# Patient Record
Sex: FEMALE | Race: WHITE | NOT HISPANIC OR LATINO | Employment: FULL TIME | URBAN - METROPOLITAN AREA
[De-identification: names, ages, dates, MRNs, and addresses within clinical notes are randomized per-mention and may not be internally consistent; named-entity substitution may affect disease eponyms.]

---

## 2023-09-08 LAB
ANION GAP IN SER/PLAS: 16 MMOL/L (ref 10–20)
CALCIDIOL (25 OH VITAMIN D3) (NG/ML) IN SER/PLAS: 34 NG/ML
CALCIUM (MG/DL) IN SER/PLAS: 9.6 MG/DL (ref 8.6–10.6)
CARBON DIOXIDE, TOTAL (MMOL/L) IN SER/PLAS: 25 MMOL/L (ref 21–32)
CHLORIDE (MMOL/L) IN SER/PLAS: 105 MMOL/L (ref 98–107)
CREATININE (MG/DL) IN SER/PLAS: 0.69 MG/DL (ref 0.5–1.05)
GFR FEMALE: >90 ML/MIN/1.73M2
GLUCOSE (MG/DL) IN SER/PLAS: 84 MG/DL (ref 74–99)
IGA (MG/DL) IN SER/PLAS: 55 MG/DL (ref 70–400)
IGG (MG/DL) IN SER/PLAS: 1510 MG/DL (ref 700–1600)
IGM (MG/DL) IN SER/PLAS: 102 MG/DL (ref 40–230)
MAGNESIUM (MG/DL) IN SER/PLAS: 2.14 MG/DL (ref 1.6–2.4)
POTASSIUM (MMOL/L) IN SER/PLAS: 4.3 MMOL/L (ref 3.5–5.3)
SODIUM (MMOL/L) IN SER/PLAS: 142 MMOL/L (ref 136–145)
UREA NITROGEN (MG/DL) IN SER/PLAS: 11 MG/DL (ref 6–23)

## 2023-09-12 LAB — VITAMIN C: 117 UMOL/L (ref 23–114)

## 2023-09-14 PROBLEM — F32.A DEPRESSION: Status: ACTIVE | Noted: 2023-09-14

## 2023-09-14 PROBLEM — E06.3 HASHIMOTO'S DISEASE: Status: ACTIVE | Noted: 2023-09-14

## 2023-09-14 PROBLEM — I73.00 RAYNAUD'S DISEASE WITHOUT GANGRENE: Status: ACTIVE | Noted: 2023-09-14

## 2023-09-14 PROBLEM — D69.6 THROMBOCYTOPENIA (CMS-HCC): Status: ACTIVE | Noted: 2023-09-14

## 2023-09-14 PROBLEM — E78.00 PURE HYPERCHOLESTEROLEMIA: Status: ACTIVE | Noted: 2023-09-14

## 2023-09-14 PROBLEM — D83.9 CVID (COMMON VARIABLE IMMUNODEFICIENCY) (MULTI): Status: ACTIVE | Noted: 2023-09-14

## 2023-09-14 PROBLEM — J31.0 CHRONIC RHINITIS: Status: ACTIVE | Noted: 2023-09-14

## 2023-09-14 PROBLEM — F41.8 MIXED ANXIETY AND DEPRESSIVE DISORDER: Status: ACTIVE | Noted: 2023-09-14

## 2023-09-14 PROBLEM — D80.2 IGA DEFICIENCY (MULTI): Status: ACTIVE | Noted: 2023-09-14

## 2023-09-14 PROBLEM — D84.9 IMMUNE DEFICIENCY DISORDER (MULTI): Status: ACTIVE | Noted: 2023-09-14

## 2023-09-14 PROBLEM — E55.9 VITAMIN D DEFICIENCY: Status: ACTIVE | Noted: 2023-09-14

## 2023-09-14 PROBLEM — R59.0 CERVICAL LYMPHADENOPATHY: Status: ACTIVE | Noted: 2023-09-14

## 2023-09-14 PROBLEM — M79.7 FIBROMYALGIA: Status: ACTIVE | Noted: 2023-09-14

## 2023-09-14 PROBLEM — R53.83 FATIGUE: Status: ACTIVE | Noted: 2023-09-14

## 2023-09-14 PROBLEM — D22.9 NEVUS: Status: ACTIVE | Noted: 2023-09-14

## 2023-09-14 PROBLEM — E07.9 THYROID DYSFUNCTION: Status: ACTIVE | Noted: 2023-09-14

## 2023-09-14 PROBLEM — D80.3 IGG SUBCLASS DEFICIENCY (MULTI): Status: ACTIVE | Noted: 2023-09-14

## 2023-09-14 PROBLEM — M26.629 TMJ SYNDROME: Status: ACTIVE | Noted: 2023-09-14

## 2023-09-14 PROBLEM — F41.0 PANIC DISORDER: Status: ACTIVE | Noted: 2023-09-14

## 2023-09-14 RX ORDER — ASPIRIN 325 MG
TABLET, DELAYED RELEASE (ENTERIC COATED) ORAL
COMMUNITY
Start: 2020-06-17

## 2023-09-14 RX ORDER — MULTIVITAMIN
TABLET ORAL
COMMUNITY

## 2023-09-14 RX ORDER — IMMUNE GLOBULIN SUBCUTANEOUS (HUMAN) 200 MG/ML
INJECTION, SOLUTION SUBCUTANEOUS
COMMUNITY
Start: 2018-05-07

## 2023-09-14 RX ORDER — COLLAGENASE CLOSTRIDIUM HIST.
POWDER (EA) MISCELLANEOUS
COMMUNITY

## 2023-09-14 RX ORDER — SERTRALINE HYDROCHLORIDE 50 MG/1
TABLET, FILM COATED ORAL
COMMUNITY
Start: 2022-12-05 | End: 2023-11-14 | Stop reason: SDUPTHER

## 2023-11-14 DIAGNOSIS — F41.8 MIXED ANXIETY AND DEPRESSIVE DISORDER: ICD-10-CM

## 2023-11-14 RX ORDER — SERTRALINE HYDROCHLORIDE 50 MG/1
TABLET, FILM COATED ORAL
Qty: 90 TABLET | Refills: 3 | Status: SHIPPED | OUTPATIENT
Start: 2023-11-14

## 2023-11-20 DIAGNOSIS — E63.9 NUTRITIONAL DEFICIENCY: ICD-10-CM

## 2023-11-20 DIAGNOSIS — D83.9 CVID (COMMON VARIABLE IMMUNODEFICIENCY) (MULTI): Primary | ICD-10-CM

## 2023-11-20 DIAGNOSIS — K63.8219 SMALL INTESTINAL BACTERIAL OVERGROWTH (SIBO): ICD-10-CM

## 2023-11-20 DIAGNOSIS — K58.0 IRRITABLE BOWEL SYNDROME WITH DIARRHEA: ICD-10-CM

## 2023-11-20 DIAGNOSIS — K58.9 IRRITABLE BOWEL SYNDROME WITHOUT DIARRHEA: ICD-10-CM

## 2023-12-07 ENCOUNTER — APPOINTMENT (OUTPATIENT)
Dept: PRIMARY CARE | Facility: CLINIC | Age: 33
End: 2023-12-07
Payer: COMMERCIAL

## 2024-02-09 ENCOUNTER — APPOINTMENT (OUTPATIENT)
Dept: ALLERGY | Facility: CLINIC | Age: 34
End: 2024-02-09
Payer: COMMERCIAL

## 2024-02-15 ENCOUNTER — LAB (OUTPATIENT)
Dept: LAB | Facility: LAB | Age: 34
End: 2024-02-15
Payer: COMMERCIAL

## 2024-02-15 ENCOUNTER — OFFICE VISIT (OUTPATIENT)
Dept: ALLERGY | Facility: CLINIC | Age: 34
End: 2024-02-15
Payer: COMMERCIAL

## 2024-02-15 VITALS
DIASTOLIC BLOOD PRESSURE: 70 MMHG | HEART RATE: 78 BPM | OXYGEN SATURATION: 99 % | SYSTOLIC BLOOD PRESSURE: 108 MMHG | BODY MASS INDEX: 23.37 KG/M2 | RESPIRATION RATE: 17 BRPM | HEIGHT: 62 IN | TEMPERATURE: 98 F | WEIGHT: 127 LBS

## 2024-02-15 DIAGNOSIS — D72.19 PERIPHERAL EOSINOPHILIA: ICD-10-CM

## 2024-02-15 DIAGNOSIS — D83.9 CVID (COMMON VARIABLE IMMUNODEFICIENCY) (MULTI): Primary | ICD-10-CM

## 2024-02-15 DIAGNOSIS — D83.9 CVID (COMMON VARIABLE IMMUNODEFICIENCY) (MULTI): ICD-10-CM

## 2024-02-15 LAB
ANION GAP SERPL CALC-SCNC: 17 MMOL/L (ref 10–20)
BUN SERPL-MCNC: 23 MG/DL (ref 6–23)
CALCIUM SERPL-MCNC: 10.1 MG/DL (ref 8.6–10.6)
CHLORIDE SERPL-SCNC: 104 MMOL/L (ref 98–107)
CO2 SERPL-SCNC: 23 MMOL/L (ref 21–32)
CREAT SERPL-MCNC: 0.87 MG/DL (ref 0.5–1.05)
EGFRCR SERPLBLD CKD-EPI 2021: 90 ML/MIN/1.73M*2
GLUCOSE SERPL-MCNC: 78 MG/DL (ref 74–99)
IGA SERPL-MCNC: 55 MG/DL (ref 70–400)
IGG SERPL-MCNC: 1460 MG/DL (ref 700–1600)
IGM SERPL-MCNC: 84 MG/DL (ref 40–230)
POTASSIUM SERPL-SCNC: 4.7 MMOL/L (ref 3.5–5.3)
SODIUM SERPL-SCNC: 139 MMOL/L (ref 136–145)
VIT B12 SERPL-MCNC: >2000 PG/ML (ref 211–911)

## 2024-02-15 PROCEDURE — 82784 ASSAY IGA/IGD/IGG/IGM EACH: CPT

## 2024-02-15 PROCEDURE — 80048 BASIC METABOLIC PNL TOTAL CA: CPT

## 2024-02-15 PROCEDURE — 99214 OFFICE O/P EST MOD 30 MIN: CPT | Performed by: ALLERGY & IMMUNOLOGY

## 2024-02-15 PROCEDURE — 36415 COLL VENOUS BLD VENIPUNCTURE: CPT

## 2024-02-15 PROCEDURE — 82607 VITAMIN B-12: CPT

## 2024-02-15 PROCEDURE — 82180 ASSAY OF ASCORBIC ACID: CPT

## 2024-02-15 NOTE — PROGRESS NOTES
Patient ID: Deena Gómez is a 33 y.o. female.     Chief Complaint: follow-up visit for CVID  History Of Present Illness  Deena Gómez is a 33 y.o. female with PMx CVID presenting for follow-up visit.    CVID:  -Overall well-controlled  -Endorses occasional low-grade fever, chills but this is normal for patient  -Continues to follow with Functional Medicine and they believe recurrent fevers may be due to recurrent EBV infections  -Endorses occasional arthralgia, but nothing out of the ordinary  -All of the above symptoms are much improved since starting infusions  -Denies night sweats, easy bleeding, arthralgia, unintentional weight loss  -Denies recent infection or antibiotic use since last office visit  -Current regimen: Cuvitru 15 g [id number]    Review of Systems    Pertinent positives and negatives have been assessed in the HPI. All other systems have been reviewed and are negative except as noted in the HPI.    Allergies  Patient has no known allergies.    Past Medical History  She has a past medical history of Migraine, unspecified, not intractable, without status migrainosus and Personal history of other diseases of the musculoskeletal system and connective tissue.    Family History  Family History   Problem Relation Name Age of Onset    Basal cell carcinoma Mother      Hypertension Mother      Other (DJD-C SPINE) Mother      Cancer Mother      Pancreatic cancer Paternal Grandfather  86    Kidney cancer Paternal Grandfather  86    Cancer Paternal Grandfather       Surgical History  She has a past surgical history that includes Other surgical history (06/17/2020).    Social/Environmental History  She has no history on file for tobacco use, alcohol use, and drug use.    MEDICATIONS  Current Outpatient Medications on File Prior to Visit   Medication Sig Dispense Refill    cholecalciferol (Vitamin D-3) 1,250 mcg (50,000 unit) capsule TAKE 1 CAPSULE Weekly      collagenase Clostridium hist. (collagenase  "clos hist., bulk,) powder USE AS DIRECTED.      immun glob G,IgG,-gly-IgA ov50 (Cuvitru) 1 gram/5 mL (20 %) solution as directed Subcutaneous Every other week      multivitamin tablet 1 tablet Orally Once a day for 30 day(s)      sertraline (Zoloft) 50 mg tablet 1.5 tablet Orally Once a day for 90 day(s) 90 tablet 3     No current facility-administered medications on file prior to visit.         /70   Pulse 78   Temp 36.7 °C (98 °F) (Temporal)   Resp 17   Ht 1.575 m (5' 2\")   Wt 57.6 kg (127 lb)   SpO2 99%   BMI 23.23 kg/m²         Wt Readings from Last 1 Encounters:   08/28/23 56.2 kg (124 lb)       Physical Exam    General: Well appearing, no acute distress  Head: Normocephalic, atraumatic, neck supple without lymphadenopathy  Eyes: EOMI, non-injected  Nose: No nasal crease, nares patent, non-edematous turbinates,no discharge  Throat: Normal dentition, no erythema  Heart: Regular rate and rhythm  Lungs: Clear to auscultation bilaterally, effort normal  Extremities: Moves all extremities symmetrically, no edema  Skin: No rashes/lesions  Psych: normal mood and affect    LAB RESULTS:  CBC:  Recent Labs     12/19/22  1800 06/25/21  1046 06/11/21  1101   WBC 5.7 3.4* 4.6   HGB 12.8 13.6 14.0   HCT 37.8 39.3 40.6    158 183   MCV 93 99 98       CMP:  Recent Labs     09/08/23  0928 12/22/22  1658 12/19/22  1800 06/19/20  0733 10/31/19  0915 06/01/19  0920 11/02/18  1601    138 138   < > 142 142 138   K 4.3 4.5 4.2   < > 4.8 5.1 4.2    103 105   < > 106 105 100   CO2 25 25 25   < > 22* 19* 24   ANIONGAP 16 15 12   < > 14 18 14   BUN 11 17 18   < > 14 14 14   CREATININE 0.69 0.81 1.01   < > 0.8 1.0 0.9   EGFR  --   --   --   --  91 70 80   MG 2.14  --   --   --   --   --   --     < > = values in this interval not displayed.     Recent Labs     12/19/22  1800 06/19/20  0733 06/01/19  0920   ALBUMIN 4.5 4.4 4.4   ALKPHOS 43 37 46   ALT 7 12 15   AST 11 15 18   BILITOT 0.5 0.6 0.3     IMMUNO: "   Recent Labs     09/08/23  0928 12/22/22  1658 08/12/22  0950   IGG 1,510 1,530 1,480   IGA 55* 55* 53*    105 103     HEME/ENDO:  Recent Labs     12/19/22  1800 08/05/21  1229 06/19/20  0733   TSH 2.11 1.82 3.39         Assessment/Plan   Assessment:  CVID    Plan:  -Continue Cuvitru 15 g [id number]  -Will repeat immune labs  -Continues to follow with Functional Medicine  -Will order Vitamin C and Vitamin B12  -Follow-up in 6 months      Discussed with attending physician,    Alton Stanton DO    I saw and evaluated the patient. I personally obtained the key and critical portions of the history and physical exam or was physically present for key and critical portions performed by the resident/fellow. I reviewed the resident/fellow's documentation and discussed the patient with the resident/fellow. I agree with the resident/fellow's medical decision making as documented in the note.    Hallie Monge DO

## 2024-02-23 LAB — VIT C SERPL-MCNC: 77 UMOL/L (ref 23–114)

## 2024-04-25 ENCOUNTER — EVALUATION (OUTPATIENT)
Dept: PHYSICAL THERAPY | Facility: CLINIC | Age: 34
End: 2024-04-25
Payer: COMMERCIAL

## 2024-04-25 DIAGNOSIS — R10.2 PELVIC PAIN: ICD-10-CM

## 2024-04-25 PROCEDURE — 97110 THERAPEUTIC EXERCISES: CPT | Mod: GP | Performed by: PHYSICAL THERAPIST

## 2024-04-25 PROCEDURE — 97112 NEUROMUSCULAR REEDUCATION: CPT | Mod: GP | Performed by: PHYSICAL THERAPIST

## 2024-04-25 PROCEDURE — 97162 PT EVAL MOD COMPLEX 30 MIN: CPT | Mod: GP | Performed by: PHYSICAL THERAPIST

## 2024-04-25 NOTE — PROGRESS NOTES
"           Physical Therapy Pelvic Floor Evaluation    Patient Name: Deena Gómez \"Braydon"  MRN: [id number]  Evaluation Date: 4/25/2024  Time Calculation  Start Time: 1230  Stop Time: 1345  Time Calculation (min): 75 min  PT Evaluation Time Entry  PT Evaluation (Moderate) Time Entry: 40     PT Therapeutic Procedures Time Entry  Neuromuscular Re-Education Time Entry: 15  Therapeutic Exercise Time Entry: 15       Problem List Items Addressed This Visit             ICD-10-CM    Pelvic pain R10.2    Relevant Orders    Follow Up In Physical Therapy        Subjective    Precautions:  Precautions  Precautions Comment: no precautions  Pain:       PELVIC HISTORY:  Chief Complaint/Description of Symptoms:   Low back and pelvic pain for years but has been dealing with other health issues (healing gut, arthritis etc).  Reporting hip and back pain and vaginal pain with intercourse.  Wants to improve posture.  Hoping to begin trying to get pregnant in 6 months. Of note sister just diagnosed with endometriosis  PMH:    Polyarthritis, immune deficiency, hashimotos, Raynauds;  gut issues  Home Environment/Social Factors/Occupation:   Does marketing remotely  Patient Primary Goal:   Wants to reduce pain, improve posture and be healthier  PELVIC PAIN:     Location: vaginal pain   Description: sharp  Aggravating factors: intercourse  Mitigating factors:,changing positions  Since onset, the symptoms are: same   In ovulation pain with intercourse is more   Back/tailbone pain since teens;  has had a history of sciatica  Sharp stabbing pain -- 8/10;    Back (chronic pain 2-9/10) and tailbone pain 8-9/10-- difficulty with transfers sit to stand    MENSTRUAL HISTORY:  Birth control for 12 years;  prior to birth control periods used to be very heavy, period is irregular, heavier bleeds through things; No birth control now... cycle is regular 7 days now, cramping in abdomen prior and during cycle  More natural planning for birth " control    OB HISTORY:  No children     BLADDER:     Daytime Voiding Frequency: 30 + times a day  Nighttime Voiding Frequency: sometimes no, 1 time a night;    Excessive Urinary Urgency: yes -- feels like escalates quickly  Unintentional urine loss: one time standing just lost entire bladder control  Leakage occurs with: holding off,  -- notices that can't hold it as much  Leakage amount: can be none to drops hasn't had another episodes of losing control  Able to completely empty bladder: feels like she doesn't empty  Frequent UTI's: history of but not recently    BOWEL:     BM Frequency: regular  Frequent constipation/straining/incomplete emptying: doesn't feel like completely empties  Had leaky gut and SIBO    FLUID/DIET INTAKE:  Gallon a day;  elimination diet -- clean diet;      EXERCISE:  Current exercise regime:   Walking daily, just started get back into     Objective   POSTURE/ALIGNMENT:  Increase in lumbar lordosis, = pelvic alignment,       ROM:  Lumbar ROM Range   Flexion 75%   Extension WFL   R sidebend WFL   L sidebend WFL     Hip AROM L R   All hip planes WFL WFL      MMT:  Hip MMT L R   Hip Flexion 4/5 4/5   Hip Extension 4-/5 4-/5   Hip Abduction 4/5 4/5   External Rotation 4-/5 4-/5   Internal Rotation 4/5 4/5      TA: poor TA activation, impaired load transfer with active supine SLR,  Tends to breath upper chest and shallow but can correct with cueing    FLEXIBILITY R/L:  Hamstrings: tight/tight  Piriformis:  tightness    EXTERNAL MUSCLE PALPATION:  Adductor: tightness bilateral (primarily proximal)     PELVIC FLOOR  Patient Position:  hooklying  EXTERNAL OBSERVATION:  Voluntary Contraction: challenged with carol    Voluntary Relaxation: challenged with relaxation  Vulvar Assessment: healthy tissue    EXTERNAL PALPATION:  Levator Ani: non tender   Bulbo: non tender   Ischiocavernosus: non tender   Transverse perineum: non tender     INTERNAL VAGINAL EXAMINATION WITH PATIENT CONSENT:  Patient  position:  hooklying  Observation:  Grimaces with discomfort with insertion and palpation    Internal:  Superficial layer mm   R>L tender vaginal introitus    Deep layer:  Obturator internus mild tenderness bilateral, most tenderness and trigger points on right with palpation of iliococcygeus, pubococcygeus and coccygeus,  Left pressure but not as painful,      Muscle Excursion  Limited muscle excursion as difficulty relaxing and carol    Strength  2-/5 with exhale    Prolapse  No visualized prolapse    Outcome Measures:  PFIQ- 37    Treatments:  Neuro Re-education:    discussed breathing and influence on pelvic floor;  educated on importance of breathwork and visualization for anxiety and relaxation;  explained anxiety relationship to pelvic floor tightness  Therapeutic exercise:    Discussed current exercise routine and breathing with exhalation      OP EDUCATION:  Outpatient Education  Individual(s) Educated: Patient  Education Provided: Anatomy, Home Exercise Program, Physiology, POC  Risk and Benefits Discussed with Patient/Caregiver/Other: yes  Patient/Caregiver Demonstrated Understanding: yes  Plan of Care Discussed and Agreed Upon: yes  Patient Response to Education: Patient/Caregiver Verbalized Understanding of Information, Patient/Caregiver Performed Return Demonstration of Exercises/Activities, Patient/Caregiver Asked Appropriate Questions    Assessment   PT Assessment Results: Decreased strength, Decreased range of motion, Decreased coordination, Pain, Impaired tone  Rehab Prognosis: Good  Evaluation/Treatment Tolerance: Patient tolerated treatment well    Pt is a 33 y.o. female who presents with impairments of pain, postural influences, weakness, and tightness. These impairments have led to functional limitations including pain with intercourse, pain with prolonged activities (walking, bending), and bowel straining and bladder urgency.  Pt would benefit from skilled physical therapy intervention  "to improve above impairments and facilitate return to function. Patient with extensive past medical history that may impact PT and or healing and has caused much health anxiety for patient    Plan:  Treatment/Interventions: Aquatic therapy, Biofeedback, Dry needling, Education/ Instruction, Electrical stimulation, Neuromuscular re-education, Manual therapy, Therapeutic exercises, Therapeutic activities  PT Plan: Skilled PT  PT Frequency: 1 time per week  Duration: 10 sessions over 14 weeks  Number of Treatments Authorized: 40 PT  Rehab Potential: Good  Plan of Care Agreement: Patient  Next session:  continue manual, begin stretching exercises to reduce tension/tightness in pelvic floor, teach \"the drop\", urge suppression, bowel elimination positions  Goals:  Active       PT Problem       STGs - 5 sessions       Start:  04/25/24    Expected End:  06/14/24       1)  Patient will be knowledgeable with regards to downtraining techniques to improve relaxation of pelvic floor muscles  2)  Patient will report 25% less pain during intercourse  3)  Patient will perform diaphragmatic breathing properly to relax and contract pelvic floor muscle appropriately  4)  Patient will demonstrate good transverse abdominis activation to stabilize lumbopelvic girdle during ADLs  5)  Patient will report successful use of urge suppression strategies at least 50% of the time  6)  Patient will be knowledgeable with regards to posture principles to reduce lumbar strain.  7)  Patient will have at least 50% improvement in back pain to allow for performance of ADLs                   LTGs - 10 sessions       Start:  04/25/24    Expected End:  08/03/24       1)  Patient will have increase LE strength by at least 1/2 MMT to improve ability to transfer sit to stand without pain  2)  Patient will report at least 75% improvement in back/hip pan to allow for ease of ADLs  3)  Patient will be knowledgeable with proper body mechanics to prevent " reoccurrence of symptoms    4)  Patient will increase tolerance to internal penetration for examination and intercourse with min to nil pain  5)  Patient will report >90% improvement in urgency symptoms without incontinence  6)  Patient will feel like she is completely able to empty bowels 90% of the time.    7)  Patient will improve pelvic floor contraction to by 1/2-1 MMT  with coordinated exhale for improved continence          Patient Stated Goal 1       Start:  04/25/24    Expected End:  08/03/24       Patient would like to correct pelvic floor dysfunction, improve posture, correct cervical lordosis

## 2024-05-03 ENCOUNTER — APPOINTMENT (OUTPATIENT)
Dept: PHYSICAL THERAPY | Facility: CLINIC | Age: 34
End: 2024-05-03
Payer: COMMERCIAL

## 2024-05-07 ENCOUNTER — TREATMENT (OUTPATIENT)
Dept: PHYSICAL THERAPY | Facility: CLINIC | Age: 34
End: 2024-05-07
Payer: COMMERCIAL

## 2024-05-07 DIAGNOSIS — R10.2 PELVIC PAIN: ICD-10-CM

## 2024-05-07 PROCEDURE — 97110 THERAPEUTIC EXERCISES: CPT | Mod: GP | Performed by: PHYSICAL THERAPIST

## 2024-05-07 PROCEDURE — 97112 NEUROMUSCULAR REEDUCATION: CPT | Mod: GP | Performed by: PHYSICAL THERAPIST

## 2024-05-07 NOTE — PROGRESS NOTES
Physical Therapy Treatment    Patient Name: Deena Gómez  MRN: [id number]  Encounter date:  5/7/2024  Time Calculation  Start Time: 1430  Stop Time: 1530  Time Calculation (min): 60 min     PT Therapeutic Procedures Time Entry  Neuromuscular Re-Education Time Entry: 25  Therapeutic Exercise Time Entry: 30    Visit Number:  2 (including evaluation)  Planned total visits: 10 per POC  Visits Authorized/Insurance Coverage:  3200 DED- MET , 100% COVERAGE, 40V PT, NO AUTH, MMO REF # [id number]    Current Problem  Problem List Items Addressed This Visit             ICD-10-CM    Pelvic pain R10.2     Precautions  Precautions  Precautions Comment: no precautions    Pain     No pain complaints today.    Subjective  General  Reason for Referral: pelvic pain  Referred By: Loli Ramirez  Past Medical History Relevant to Rehab: See evalaution for PMHx     Does report that she is working on breathing.      Objective  Tightness in hamstrings and piriformis noted    Treatment:  Neuro Re-education:    Urge suppression techniques to begin retraining bladder-- breathing, heel raises, singing,   Discussed bowel elimination position with use of squatty potty and breathing to improve elimination  Therapeutic exercise:    Stretching to include:  Happy baby 10 sec x 3   Knee to chest with opposite leg straight 10 sec x 3   Piriformis stretch 10 sec x 3 bilateral  Butterfly stretch 10 sec x 3   Strengthening and postural strength:    Supine side pull x 10 green band   Supine ER of shoulders x 10 orange band  Standing extension shoulders bilateral x 10 green band (cues to avoid hiking shoulders and straining cervical spine)      Current HEP:  Access Code: [email or web address]  URL: [email or web address]  Date: 05/07/2024  Prepared by: Nicci Ratliff    Exercises  - Supine Pelvic Floor Stretch  - 1 x daily - 7 x weekly - 1 sets - 4 reps - 10 sec hold  - Supine Butterfly Groin Stretch  - 1 x daily - 7 x weekly - 1 sets - 4 reps - 10  sec hold  - Single Knee to Chest Stretch  - 1 x daily - 7 x weekly - 1 sets - 4 reps - 10sec hold  - Supine Piriformis Stretch  - 1 x daily - 7 x weekly - 1 sets - 3 reps - 10 sec hold  - Supine Shoulder Horizontal Abduction with Resistance  - 1 x daily - 7 x weekly - 1 sets - 10 reps  - Supine Shoulder External Rotation with Resistance  - 1 x daily - 7 x weekly - 1 sets - 10 reps  - Single Arm Shoulder Extension with Anchored Resistance  - 1 x daily - 7 x weekly - 1 sets - 10 reps      OP EDUCATION:     Updated HEP and urge suppression and elimination techniques  Assessment:     Pt's response to treatment:  Patient with good technique for breathing.  Patient has been educated on urge suppression techniques and how to incorporate cooper routine to reduce bathroom usage without incontinence.  Patient with tightness that will lessen with continued stretching performance    Pain end of session: 0    Plan:  OP PT Plan  Treatment/Interventions: Aquatic therapy, Biofeedback, Dry needling, Education/ Instruction, Electrical stimulation, Neuromuscular re-education, Manual therapy, Therapeutic exercises, Therapeutic activities  PT Plan: Skilled PT  PT Frequency: 1 time per week  Duration: 10 sessions over 14 weeks  Number of Treatments Authorized: 40 PT  Rehab Potential: Good  Plan of Care Agreement: Patient  Continue with current POC/no changes -- manual to pelvic floor    Assessment of current progress against goals:  Progressing toward functional goals    Goals:  Active       PT Problem       STGs - 5 sessions       Start:  04/25/24    Expected End:  06/14/24       1)  Patient will be knowledgeable with regards to downtraining techniques to improve relaxation of pelvic floor muscles  2)  Patient will report 25% less pain during intercourse  3)  Patient will perform diaphragmatic breathing properly to relax and contract pelvic floor muscle appropriately  4)  Patient will demonstrate good transverse abdominis activation to  stabilize lumbopelvic girdle during ADLs  5)  Patient will report successful use of urge suppression strategies at least 50% of the time  6)  Patient will be knowledgeable with regards to posture principles to reduce lumbar strain.  7)  Patient will have at least 50% improvement in back pain to allow for performance of ADLs                   LTGs - 10 sessions       Start:  04/25/24    Expected End:  08/03/24       1)  Patient will have increase LE strength by at least 1/2 MMT to improve ability to transfer sit to stand without pain  2)  Patient will report at least 75% improvement in back/hip pan to allow for ease of ADLs  3)  Patient will be knowledgeable with proper body mechanics to prevent reoccurrence of symptoms    4)  Patient will increase tolerance to internal penetration for examination and intercourse with min to nil pain  5)  Patient will report >90% improvement in urgency symptoms without incontinence  6)  Patient will feel like she is completely able to empty bowels 90% of the time.    7)  Patient will improve pelvic floor contraction to by 1/2-1 MMT  with coordinated exhale for improved continence          Patient Stated Goal 1       Start:  04/25/24    Expected End:  08/03/24       Patient would like to correct pelvic floor dysfunction, improve posture, correct cervical lordosis

## 2024-05-14 ENCOUNTER — DOCUMENTATION (OUTPATIENT)
Dept: PHYSICAL THERAPY | Facility: CLINIC | Age: 34
End: 2024-05-14
Payer: COMMERCIAL

## 2024-05-14 ENCOUNTER — APPOINTMENT (OUTPATIENT)
Dept: PHYSICAL THERAPY | Facility: CLINIC | Age: 34
End: 2024-05-14
Payer: COMMERCIAL

## 2024-05-14 NOTE — PROGRESS NOTES
Physical Therapy Treatment    Patient Name: Deena Gómez  MRN: [id number]  Encounter date:  5/14/2024             Visit Number:  Visit count could not be calculated. Make sure you are using a visit which is associated with an episode. (including evaluation)  Planned total visits: 10 per POC  Visits Authorized/Insurance Coverage:  3200 DED- MET , 100% COVERAGE, 40V PT, NO AUTH, MMO REF # [id number]    Current Problem  Problem List Items Addressed This Visit    None        Precautions       Pain       Subjective  General        ***    Objective  ***      Treatment:    Neuro Re-education:    Urge suppression techniques to begin retraining bladder-- breathing, heel raises, singing,   Discussed bowel elimination position with use of squatty potty and breathing to improve elimination  Therapeutic exercise:    Stretching to include:  Happy baby 10 sec x 3   Knee to chest with opposite leg straight 10 sec x 3   Piriformis stretch 10 sec x 3 bilateral  Butterfly stretch 10 sec x 3   Strengthening and postural strength:    Supine side pull x 10 green band   Supine ER of shoulders x 10 orange band  Standing extension shoulders bilateral x 10 green band (cues to avoid hiking shoulders and straining cervical spine)     Current HEP:  Access Code: [email or web address]  URL: [email or web address]  Date: 05/07/2024  Prepared by: Nicci Ratliff     Exercises  - Supine Pelvic Floor Stretch  - 1 x daily - 7 x weekly - 1 sets - 4 reps - 10 sec hold  - Supine Butterfly Groin Stretch  - 1 x daily - 7 x weekly - 1 sets - 4 reps - 10 sec hold  - Single Knee to Chest Stretch  - 1 x daily - 7 x weekly - 1 sets - 4 reps - 10sec hold  - Supine Piriformis Stretch  - 1 x daily - 7 x weekly - 1 sets - 3 reps - 10 sec hold  - Supine Shoulder Horizontal Abduction with Resistance  - 1 x daily - 7 x weekly - 1 sets - 10 reps  - Supine Shoulder External Rotation with Resistance  - 1 x daily - 7 x weekly - 1 sets - 10 reps  - Single Arm Shoulder  Extension with Anchored Resistance  - 1 x daily - 7 x weekly - 1 sets - 10 reps    OP EDUCATION:       Assessment:     Pt's response to treatment:  ***  Areas of improvements:  ***  Limitations/deficits:  ***    Pain end of session: ***    Plan:     {BASPLAN:11486}    Assessment of current progress against goals:  {BASPTNOTEGOALASSESSMENT:93252}    Goals:

## 2024-05-21 ENCOUNTER — TREATMENT (OUTPATIENT)
Dept: PHYSICAL THERAPY | Facility: CLINIC | Age: 34
End: 2024-05-21
Payer: COMMERCIAL

## 2024-05-21 DIAGNOSIS — R10.2 PELVIC PAIN: ICD-10-CM

## 2024-05-21 PROCEDURE — 97110 THERAPEUTIC EXERCISES: CPT | Mod: GP | Performed by: PHYSICAL THERAPIST

## 2024-05-21 PROCEDURE — 97112 NEUROMUSCULAR REEDUCATION: CPT | Mod: GP | Performed by: PHYSICAL THERAPIST

## 2024-05-21 PROCEDURE — 97140 MANUAL THERAPY 1/> REGIONS: CPT | Mod: GP | Performed by: PHYSICAL THERAPIST

## 2024-05-21 NOTE — PROGRESS NOTES
Physical Therapy Treatment    Patient Name: Deena Gómez  MRN: [id number]  Encounter date:  5/21/2024  Time Calculation  Start Time: 1805  Stop Time: 1905  Time Calculation (min): 60 min     PT Therapeutic Procedures Time Entry  Manual Therapy Time Entry: 15  Neuromuscular Re-Education Time Entry: 10  Therapeutic Exercise Time Entry: 35    Visit Number:  3 (including evaluation)  Planned total visits: 10 per POC  Visits Authorized/Insurance Coverage:  3200 DED- MET , 100% COVERAGE, 40V PT, NO AUTH, MMO REF # [id number]    Current Problem  Problem List Items Addressed This Visit             ICD-10-CM    Pelvic pain R10.2     Precautions     No precautions  Pain     1-2/10 LBP    Subjective  General        Has been able to have intercourse without pain and focused on stretching prior to which helped.  Patient and  remodeling kitchen.  Questions her posture -- tries to overcorrect at times.  Bowel movements better.      Objective  Right anterior rotation of innominate    Treatment:  Manual:    Correction of rotation with MET of shotgun, leg pull on left, and left flexion with right extension  Therapeutic exercise:    Instructed in self correction exercises  Hip adduction with pillow x 5  Leg lengthener on left x 3   Isometric left hip flexion with right heel dig x 5   Hip abduction with blue band to tension x 10   TA activation (fingers used for tactile input) x 10   Neuro Re-education  Discussed posture and encouraged neutral spine instead of APT or PPT during standing and walking  Discussed working posture for neck and discussed chair       Current HEP:  Access Code: [email or web address]  URL: [email or web address]  Date: 05/21/2024  Prepared by: Nicci Ratliff    Exercises  - Supine Hip adduction isometric with Ball -- Exercise 1   - 1 x daily - 7 x weekly - 1 sets - 5 reps - 5sec hold  - Hooklying SI Joint Self-Correction  - 1 x daily - 7 x weekly - 1 sets - 5 reps - 5 sec hold  - Hooklying [place]  with Resistance -- Exercise 4   - 1 x daily - 7 x weekly - 1 sets - 5-10 reps  - Supine Transversus Abdominis Bracing - Hands on Stomach  - 1 x daily - 7 x weekly - 1 sets - 10 reps     Assessment:     Pt's response to treatment:  Able to correct alignment with MET.  Patient issued self MET to maintain correction.  Patient able to activate TA properlywith using fingertips for tactile input.  Patient with less pain with core activation.  May need to address mattress as pain increases overnight and sleeping.      Pain end of session: 0-1/10    Plan:     Continue with current POC/no changes -- manual to pelvic floor as needed    Assessment of current progress against goals:  Progressing toward functional goals    Goals:  Active       PT Problem       STGs - 5 sessions       Start:  04/25/24    Expected End:  06/14/24       1)  Patient will be knowledgeable with regards to downtraining techniques to improve relaxation of pelvic floor muscles  2)  Patient will report 25% less pain during intercourse  3)  Patient will perform diaphragmatic breathing properly to relax and contract pelvic floor muscle appropriately  4)  Patient will demonstrate good transverse abdominis activation to stabilize lumbopelvic girdle during ADLs  5)  Patient will report successful use of urge suppression strategies at least 50% of the time  6)  Patient will be knowledgeable with regards to posture principles to reduce lumbar strain.  7)  Patient will have at least 50% improvement in back pain to allow for performance of ADLs                   LTGs - 10 sessions       Start:  04/25/24    Expected End:  08/03/24       1)  Patient will have increase LE strength by at least 1/2 MMT to improve ability to transfer sit to stand without pain  2)  Patient will report at least 75% improvement in back/hip pan to allow for ease of ADLs  3)  Patient will be knowledgeable with proper body mechanics to prevent reoccurrence of symptoms    4)  Patient will  increase tolerance to internal penetration for examination and intercourse with min to nil pain  5)  Patient will report >90% improvement in urgency symptoms without incontinence  6)  Patient will feel like she is completely able to empty bowels 90% of the time.    7)  Patient will improve pelvic floor contraction to by 1/2-1 MMT  with coordinated exhale for improved continence          Patient Stated Goal 1       Start:  04/25/24    Expected End:  08/03/24       Patient would like to correct pelvic floor dysfunction, improve posture, correct cervical lordosis

## 2024-05-30 ENCOUNTER — APPOINTMENT (OUTPATIENT)
Dept: PHYSICAL THERAPY | Facility: CLINIC | Age: 34
End: 2024-05-30
Payer: COMMERCIAL

## 2024-06-07 ENCOUNTER — TREATMENT (OUTPATIENT)
Dept: PHYSICAL THERAPY | Facility: CLINIC | Age: 34
End: 2024-06-07
Payer: COMMERCIAL

## 2024-06-07 DIAGNOSIS — R10.2 PELVIC PAIN: ICD-10-CM

## 2024-06-07 PROCEDURE — 97140 MANUAL THERAPY 1/> REGIONS: CPT | Mod: GP | Performed by: PHYSICAL THERAPIST

## 2024-06-07 PROCEDURE — 97110 THERAPEUTIC EXERCISES: CPT | Mod: GP | Performed by: PHYSICAL THERAPIST

## 2024-06-07 NOTE — PROGRESS NOTES
Physical Therapy Treatment    Patient Name: Deena Gómez  MRN: [id number]  Encounter date:  6/7/2024  Time Calculation  Start Time: 1140  Stop Time: 1225  Time Calculation (min): 45 min     PT Therapeutic Procedures Time Entry  Manual Therapy Time Entry: 35  Therapeutic Exercise Time Entry: 10    Visit Number:  4 (including evaluation)  Planned total visits: 10 per POC  Visits Authorized/Insurance Coverage:  3200 DED- MET , 100% COVERAGE, 40V PT, NO AUTH, MMO REF # [id number]    Current Problem  Problem List Items Addressed This Visit             ICD-10-CM    Pelvic pain R10.2       Precautions     No precautions  Pain     Back pain mostly tightness -- thinks she slept wrong;  3/10    Subjective  General      A few minutes late as spilled something in car.    Pain with intercourse is usually more ovulation time;  Alittle of pain;  foam rolling 2 times a day    Objective  = innominate   Tender left SIJ, left lateral border of sacrum    Treatment:  Manual:    DN  Verbal informed consent post discussion of risks and benefits.    Palpation to reveal tender areas.  Safety zones assessed.    Dry needling performed with pt positioned prone over pillow    30mm needles inserted in gluteal medius medial and lateral,   40mm needles in gluteal minimus  15mm SIJ and along lateral border of sacrum x 4   Needles left in situ for 4 minutes.  Needles wound/pistoned prior to removal.  Universal and standard precautions maintained.  No adverse reaction noted post needle removal.     STM to left gluteal and lumbar paraspinals with free up   Patient to use heat after session to continue to increase blood flow and reduce tightness  Therapeutic exercise:    Piriformis stretch with red ball x 10 sec x 3   Modified happy baby on red ball 10 sec x 3      Current HEP:  Access Code: [email or web address]  URL: [email or web address]  Date: 05/21/2024  Prepared by: Nicci Ratliff    Exercises  - Supine Hip adduction isometric with Ball --  Exercise 1   - 1 x daily - 7 x weekly - 1 sets - 5 reps - 5sec hold  - Hooklying SI Joint Self-Correction  - 1 x daily - 7 x weekly - 1 sets - 5 reps - 5 sec hold  - Hooklying Clamshell with Resistance -- Exercise 4   - 1 x daily - 7 x weekly - 1 sets - 5-10 reps  - Supine Transversus Abdominis Bracing - Hands on Stomach  - 1 x daily - 7 x weekly - 1 sets - 10 reps     Assessment:     Pt's response to treatment:    Less pain after session.  DN initiated to reduce tightness on left gluteal an dlumbar paraspinal.  Patient advised to continue with stretching exercises.  Patient continues to benefit from skilled PT to reduce pain and tightness and progress towards established physical therapy goals.     Pain end of session: 1/10    Plan:     Continue with current POC/no changes -- manual to pelvic floor as needed    Assessment of current progress against goals:  Progressing toward functional goals    Goals:  Active       PT Problem       STGs - 5 sessions       Start:  04/25/24    Expected End:  06/14/24       1)  Patient will be knowledgeable with regards to downtraining techniques to improve relaxation of pelvic floor muscles  2)  Patient will report 25% less pain during intercourse  3)  Patient will perform diaphragmatic breathing properly to relax and contract pelvic floor muscle appropriately  4)  Patient will demonstrate good transverse abdominis activation to stabilize lumbopelvic girdle during ADLs  5)  Patient will report successful use of urge suppression strategies at least 50% of the time  6)  Patient will be knowledgeable with regards to posture principles to reduce lumbar strain.  7)  Patient will have at least 50% improvement in back pain to allow for performance of ADLs                   LTGs - 10 sessions       Start:  04/25/24    Expected End:  08/03/24       1)  Patient will have increase LE strength by at least 1/2 MMT to improve ability to transfer sit to stand without pain  2)  Patient will report  at least 75% improvement in back/hip pan to allow for ease of ADLs  3)  Patient will be knowledgeable with proper body mechanics to prevent reoccurrence of symptoms    4)  Patient will increase tolerance to internal penetration for examination and intercourse with min to nil pain  5)  Patient will report >90% improvement in urgency symptoms without incontinence  6)  Patient will feel like she is completely able to empty bowels 90% of the time.    7)  Patient will improve pelvic floor contraction to by 1/2-1 MMT  with coordinated exhale for improved continence          Patient Stated Goal 1       Start:  04/25/24    Expected End:  08/03/24       Patient would like to correct pelvic floor dysfunction, improve posture, correct cervical lordosis

## 2024-06-14 ENCOUNTER — APPOINTMENT (OUTPATIENT)
Dept: PHYSICAL THERAPY | Facility: CLINIC | Age: 34
End: 2024-06-14
Payer: COMMERCIAL

## 2024-06-21 ENCOUNTER — TREATMENT (OUTPATIENT)
Dept: PHYSICAL THERAPY | Facility: CLINIC | Age: 34
End: 2024-06-21
Payer: COMMERCIAL

## 2024-06-21 DIAGNOSIS — R10.2 PELVIC PAIN: ICD-10-CM

## 2024-06-21 PROCEDURE — 97110 THERAPEUTIC EXERCISES: CPT | Mod: GP | Performed by: PHYSICAL THERAPIST

## 2024-06-21 PROCEDURE — 97140 MANUAL THERAPY 1/> REGIONS: CPT | Mod: GP | Performed by: PHYSICAL THERAPIST

## 2024-06-21 NOTE — PROGRESS NOTES
Physical Therapy Treatment    Patient Name: Deena Gómez  MRN: [id number]  Encounter date:  6/21/2024  Time Calculation  Start Time: 1130  Stop Time: 1230  Time Calculation (min): 60 min     PT Therapeutic Procedures Time Entry  Manual Therapy Time Entry: 30  Therapeutic Exercise Time Entry: 18  Therapeutic Activity Time Entry: 5    Visit Number:  5 (including evaluation)  Planned total visits: 10 per POC  Visits Authorized/Insurance Coverage:  3200 DED- MET , 100% COVERAGE, 40V PT, NO AUTH, MMO REF # [id number]    Current Problem  Problem List Items Addressed This Visit             ICD-10-CM    Pelvic pain R10.2       Precautions     No precautions  Pain     Back pain mostly tightness 2-3/10    Subjective  General     Feels like the pain in back and pelvic floor is cut in half;  doing stretching on a regular basis.  Sitting or standing for prolonged time feels more pain;    Feels like the needling is helpful.      Objective  = innominate   Tender left SIJ, left lateral border of sacrum    Treatment:  Manual:    DN  Verbal informed consent post discussion of risks and benefits.    Palpation to reveal tender areas.  Safety zones assessed.    Dry needling performed with pt positioned prone over pillow -- bilateral needling   30mm needles inserted in gluteal medius medial and lateral,   40mm needles in gluteal minimus  30 gluteal qi  15mm Sacral hiatus   Needles left in situ for 5 minutes.  Needles wound/pistoned prior to removal.  Universal and standard precautions maintained.  No adverse reaction noted post needle removal.     STM to left gluteal and lumbar paraspinals with free up   Patient to use heat after session to continue to increase blood flow and reduce tightness  Therapeutic exercise:    Dead bug x 10   Trial of heel taps  but this increased back pain --stopped  Bridging with lat pull down blue x 10   Quadruped alternate UE x 10   Therapeutic activity:    Discussed using lumbar supports for  long sitting chery     Current HEP:  Access Code: [email or web address]  URL: [email or web address]  Date: 05/21/2024  Prepared by: Nicci Ratliff    Exercises  - Supine Hip adduction isometric with Ball -- Exercise 1   - 1 x daily - 7 x weekly - 1 sets - 5 reps - 5sec hold  - Hooklying SI Joint Self-Correction  - 1 x daily - 7 x weekly - 1 sets - 5 reps - 5 sec hold  - Hooklying Clamshell with Resistance -- Exercise 4   - 1 x daily - 7 x weekly - 1 sets - 5-10 reps  - Supine Transversus Abdominis Bracing - Hands on Stomach  - 1 x daily - 7 x weekly - 1 sets - 10 reps     Assessment:     Pt's response to treatment:    Continues to improve in pain and tightness and responds well to dry needling.  Patient continues to maintain pelvic alignment.  Minimal to nil complaints with regards to pelvic floor pain during intercourse.  Patient continues to benefit from skilled PT to strengthen core and progress towards established physical therapy goals.     Pain end of session: 1/10    Plan:     Continue with current POC/no changes -- manual to pelvic floor as needed    Assessment of current progress against goals:  Progressing toward functional goals    Goals:  Active       PT Problem       STGs - 5 sessions       Start:  04/25/24    Expected End:  06/14/24       1)  Patient will be knowledgeable with regards to downtraining techniques to improve relaxation of pelvic floor muscles  2)  Patient will report 25% less pain during intercourse  3)  Patient will perform diaphragmatic breathing properly to relax and contract pelvic floor muscle appropriately  4)  Patient will demonstrate good transverse abdominis activation to stabilize lumbopelvic girdle during ADLs  5)  Patient will report successful use of urge suppression strategies at least 50% of the time  6)  Patient will be knowledgeable with regards to posture principles to reduce lumbar strain.  7)  Patient will have at least 50% improvement in back pain to allow for performance of  ADLs                   LTGs - 10 sessions       Start:  04/25/24    Expected End:  08/03/24       1)  Patient will have increase LE strength by at least 1/2 MMT to improve ability to transfer sit to stand without pain  2)  Patient will report at least 75% improvement in back/hip pan to allow for ease of ADLs  3)  Patient will be knowledgeable with proper body mechanics to prevent reoccurrence of symptoms    4)  Patient will increase tolerance to internal penetration for examination and intercourse with min to nil pain  5)  Patient will report >90% improvement in urgency symptoms without incontinence  6)  Patient will feel like she is completely able to empty bowels 90% of the time.    7)  Patient will improve pelvic floor contraction to by 1/2-1 MMT  with coordinated exhale for improved continence          Patient Stated Goal 1       Start:  04/25/24    Expected End:  08/03/24       Patient would like to correct pelvic floor dysfunction, improve posture, correct cervical lordosis

## 2024-07-02 ENCOUNTER — APPOINTMENT (OUTPATIENT)
Dept: PHYSICAL THERAPY | Facility: CLINIC | Age: 34
End: 2024-07-02
Payer: COMMERCIAL

## 2024-07-09 ENCOUNTER — TREATMENT (OUTPATIENT)
Dept: PHYSICAL THERAPY | Facility: CLINIC | Age: 34
End: 2024-07-09
Payer: COMMERCIAL

## 2024-07-09 DIAGNOSIS — R10.2 PELVIC PAIN: ICD-10-CM

## 2024-07-09 PROCEDURE — 97140 MANUAL THERAPY 1/> REGIONS: CPT | Mod: GP | Performed by: PHYSICAL THERAPIST

## 2024-07-09 PROCEDURE — 97110 THERAPEUTIC EXERCISES: CPT | Mod: GP | Performed by: PHYSICAL THERAPIST

## 2024-07-09 NOTE — PROGRESS NOTES
Physical Therapy Treatment    Patient Name: Deena Gómez  MRN: [id number]  Encounter date:  7/9/2024  Time Calculation  Start Time: 1130  Stop Time: 1230  Time Calculation (min): 60 min     PT Therapeutic Procedures Time Entry  Manual Therapy Time Entry: 38  Therapeutic Exercise Time Entry: 15    Visit Number:  6 (including evaluation)  Planned total visits: 10 per POC  Visits Authorized/Insurance Coverage:  3200 DED- MET , 100% COVERAGE, 40V PT, NO AUTH, MMO REF # [id number]    Current Problem  Problem List Items Addressed This Visit             ICD-10-CM    Pelvic pain R10.2         Precautions     No precautions  Pain     Back pain when flares up 4/10;    Pelvic pain is more tightness;        Subjective  General     Feels like exercises are doing well.  As stress increases tightness in pelvic floor increases and back pain increases    Objective  After pelvic floor manual treatment had sharp SI pain on right but reduced after stretching to regular soreness;  Tighter right>left iliococcygeus and pubococcygeus (+ referral pattern to LBP with palpation of trigger point)     Treatment:  Manual:    Verbal informed consent to pelvic floor examination and treatment:    STM and trigger point release to levator ani and obturator internus R>L in hooklying    STM with free up to right gluteal medius, piriformis and SI, gluteal qi in left sidelying    Therapeutic exercise:    DKTC with ball (yellow) tried supported happy baby but this increased SI pain.  No pain with DKTC with ball x 5 10 sec  Small LTR with yellow ball x 10      Current HEP:  Access Code: [email or web address]  URL: [email or web address]  Date: 05/21/2024  Prepared by: Nicci Ratliff    Exercises  - Supine Hip adduction isometric with Ball -- Exercise 1   - 1 x daily - 7 x weekly - 1 sets - 5 reps - 5sec hold  - Hooklying SI Joint Self-Correction  - 1 x daily - 7 x weekly - 1 sets - 5 reps - 5 sec hold  - Hooklying Clamshell with Resistance --  Exercise 4   - 1 x daily - 7 x weekly - 1 sets - 5-10 reps  - Supine Transversus Abdominis Bracing - Hands on Stomach  - 1 x daily - 7 x weekly - 1 sets - 10 reps     Assessment:     Pt's response to treatment:    Patient with less pelvic floor pain and tightness since evaluation.  Low back continues to impact symptoms.  Patient continues to benefit from PT to reduce tightness to pelvic floor and improve core stabilization for low back symptoms    Pain end of session: 4/10 low back    Plan:     Continue with current POC/no changes -- manual to pelvic floor as needed    Assessment of current progress against goals:  Progressing toward functional goals    Goals:  Active       PT Problem       STGs - 5 sessions       Start:  04/25/24    Expected End:  06/14/24       1)  Patient will be knowledgeable with regards to downtraining techniques to improve relaxation of pelvic floor muscles  2)  Patient will report 25% less pain during intercourse  3)  Patient will perform diaphragmatic breathing properly to relax and contract pelvic floor muscle appropriately  4)  Patient will demonstrate good transverse abdominis activation to stabilize lumbopelvic girdle during ADLs  5)  Patient will report successful use of urge suppression strategies at least 50% of the time  6)  Patient will be knowledgeable with regards to posture principles to reduce lumbar strain.  7)  Patient will have at least 50% improvement in back pain to allow for performance of ADLs                   LTGs - 10 sessions       Start:  04/25/24    Expected End:  08/03/24       1)  Patient will have increase LE strength by at least 1/2 MMT to improve ability to transfer sit to stand without pain  2)  Patient will report at least 75% improvement in back/hip pan to allow for ease of ADLs  3)  Patient will be knowledgeable with proper body mechanics to prevent reoccurrence of symptoms    4)  Patient will increase tolerance to internal penetration for examination and  intercourse with min to nil pain  5)  Patient will report >90% improvement in urgency symptoms without incontinence  6)  Patient will feel like she is completely able to empty bowels 90% of the time.    7)  Patient will improve pelvic floor contraction to by 1/2-1 MMT  with coordinated exhale for improved continence          Patient Stated Goal 1       Start:  04/25/24    Expected End:  08/03/24       Patient would like to correct pelvic floor dysfunction, improve posture, correct cervical lordosis

## 2024-07-23 ENCOUNTER — APPOINTMENT (OUTPATIENT)
Dept: PHYSICAL THERAPY | Facility: CLINIC | Age: 34
End: 2024-07-23
Payer: COMMERCIAL

## 2024-07-23 DIAGNOSIS — R10.2 PELVIC PAIN: ICD-10-CM

## 2024-07-23 PROCEDURE — 97140 MANUAL THERAPY 1/> REGIONS: CPT | Mod: GP | Performed by: PHYSICAL THERAPIST

## 2024-07-23 NOTE — PROGRESS NOTES
Physical Therapy Treatment    Patient Name: Deena Gómez  MRN: [id number]  Encounter date:  7/23/2024  Time Calculation  Start Time: 1230  Stop Time: 1330  Time Calculation (min): 60 min     PT Therapeutic Procedures Time Entry  Manual Therapy Time Entry: 56    Visit Number:  7 (including evaluation)  Planned total visits: 10 per POC  Visits Authorized/Insurance Coverage:  3200 DED- MET , 100% COVERAGE, 40V PT, NO AUTH, MMO REF # [id number]    Current Problem  Problem List Items Addressed This Visit             ICD-10-CM    Pelvic pain R10.2           Precautions     No precautions  Pain     Back pain when flares up 4/10;    Pelvic pain is more tightness;        Subjective  General     Alternating between constipation and diarrhea, pelvic floor tightness, pain with sex again felt raw and rubbing;  having a lot of external stress lately;      Objective  Tender bilateral lateral border of sacrum  Tighter right>left iliococcygeus and pubococcygeus (+ referral pattern to LBP with palpation of trigger point)     Treatment:  Manual:    Verbal informed consent to pelvic floor examination and treatment:    STM and trigger point release to levator ani and obturator internus R>L in hooklying  Discussed use of wand vs dilator to perform self release  Encouraged breathing throughout manual to improve relaxation of muscles  MFR to lumbar paraspinals and around sacrum.    Verbal informed consent post discussion of risks and benefits.    Palpation to reveal tender areas.  Safety zones assessed.    Dry needling performed with pt positioned prone over pillow   25mm  needle inserted in sacral hiatus bilateral  25mm needles in lateral sacral border bilateral  30mm gluteal medius bilateral   Needles left in situ for 5 minutes.  Needles wound/pistoned prior to removal.  Universal and standard precautions maintained.  No adverse reaction noted post needle removal.     Encouraged use of heat post session     Current  HEP:  Access Code: [email or web address]  URL: [email or web address]  Date: 05/21/2024  Prepared by: Nicci Ratliff    Exercises  - Supine Hip adduction isometric with Ball -- Exercise 1   - 1 x daily - 7 x weekly - 1 sets - 5 reps - 5sec hold  - Hooklying SI Joint Self-Correction  - 1 x daily - 7 x weekly - 1 sets - 5 reps - 5 sec hold  - Hooklying Clamshell with Resistance -- Exercise 4   - 1 x daily - 7 x weekly - 1 sets - 5-10 reps  - Supine Transversus Abdominis Bracing - Hands on Stomach  - 1 x daily - 7 x weekly - 1 sets - 10 reps     Assessment:     Pt's response to treatment:    Patient with increase in pelvic floor tightness that may be related to external family stressors.  Patient also with tightness in lumbar spine that may be impacting pelvic floor or vice versa.  Patient is encouraged to focus on stretching to reduce tightness and focus on more restorative poses instead of active.      Pain end of session: 4/10 low back    Plan:     Continue with current POC/no changes -- manual to pelvic floor as needed    Assessment of current progress against goals:  Progressing toward functional goals    Goals:  Active       PT Problem       STGs - 5 sessions       Start:  04/25/24    Expected End:  06/14/24       1)  Patient will be knowledgeable with regards to downtraining techniques to improve relaxation of pelvic floor muscles  2)  Patient will report 25% less pain during intercourse  3)  Patient will perform diaphragmatic breathing properly to relax and contract pelvic floor muscle appropriately  4)  Patient will demonstrate good transverse abdominis activation to stabilize lumbopelvic girdle during ADLs  5)  Patient will report successful use of urge suppression strategies at least 50% of the time  6)  Patient will be knowledgeable with regards to posture principles to reduce lumbar strain.  7)  Patient will have at least 50% improvement in back pain to allow for performance of ADLs                   LTGs - 10  sessions       Start:  04/25/24    Expected End:  08/03/24       1)  Patient will have increase LE strength by at least 1/2 MMT to improve ability to transfer sit to stand without pain  2)  Patient will report at least 75% improvement in back/hip pan to allow for ease of ADLs  3)  Patient will be knowledgeable with proper body mechanics to prevent reoccurrence of symptoms    4)  Patient will increase tolerance to internal penetration for examination and intercourse with min to nil pain  5)  Patient will report >90% improvement in urgency symptoms without incontinence  6)  Patient will feel like she is completely able to empty bowels 90% of the time.    7)  Patient will improve pelvic floor contraction to by 1/2-1 MMT  with coordinated exhale for improved continence          Patient Stated Goal 1       Start:  04/25/24    Expected End:  08/03/24       Patient would like to correct pelvic floor dysfunction, improve posture, correct cervical lordosis

## 2024-07-31 ENCOUNTER — APPOINTMENT (OUTPATIENT)
Dept: PHYSICAL THERAPY | Facility: CLINIC | Age: 34
End: 2024-07-31
Payer: COMMERCIAL

## 2024-08-06 ENCOUNTER — TREATMENT (OUTPATIENT)
Dept: PHYSICAL THERAPY | Facility: CLINIC | Age: 34
End: 2024-08-06
Payer: COMMERCIAL

## 2024-08-06 DIAGNOSIS — R10.2 PELVIC PAIN: ICD-10-CM

## 2024-08-06 PROCEDURE — 97110 THERAPEUTIC EXERCISES: CPT | Mod: GP | Performed by: PHYSICAL THERAPIST

## 2024-08-06 NOTE — PROGRESS NOTES
Physical Therapy Treatment/Progress Note    Patient Name: Deena Gómez  MRN: [id number]  Encounter date:  8/6/2024  Time Calculation  Start Time: 1130  Stop Time: 1230  Time Calculation (min): 60 min     PT Therapeutic Procedures Time Entry  Therapeutic Exercise Time Entry: 54    Visit Number:  8 (including evaluation)  Planned total visits: 10 per POC  Visits Authorized/Insurance Coverage:  3200 DED- MET , 100% COVERAGE, 40V PT, NO AUTH, MMO REF # [id number]    Current Problem  Problem List Items Addressed This Visit             ICD-10-CM    Pelvic pain R10.2     Precautions     No precautions  Pain     Less back pain 2/10  Pelvic pain is more tightness;        Subjective  General     Reports taking eggs out of diet and gut is feeling better.  Resuming her stretching program has been really helpful.  Beginning to work on core stabilization at home.  Pain with intercourse is better -- doesn't think she need manual to this    Objective  Cautious with exercises as patient can sometimes feel in back instead of core.    Winging scapula bilateral    Treatment:  Therapeutic Exercise:    Discussed when how to progress exercises  Patient demonstrated her current stretching routine.  ([place] with thread the needle, adductor lunge stretch in high kneel), thoracic mobility)   Bear 5 sec x 5   Quadruped with alternate UE x 10   Quadruped with alternate LE x 10   Quadruped with hip flexion and arm flexion tuck x 5 bilateral   Trial of pilates 100 but stopped because of Low back pain  Standing lat pull down green x 10   Standing with shoulder extension pulses x 20   Sidesteps antirotation with green back x 5 bilateral   Push outs with green back in antirotation stance x 10 bilateral   Modified side plank 10 sec x 5 bilateral     Current HEP:  Access Code: [email or web address]  URL: [email or web address]  Date: 05/21/2024  Prepared by: Nicci Ratliff    Exercises  - Supine Hip adduction isometric with Ball -- Exercise 1   -  1 x daily - 7 x weekly - 1 sets - 5 reps - 5sec hold  - Hooklying SI Joint Self-Correction  - 1 x daily - 7 x weekly - 1 sets - 5 reps - 5 sec hold  - Hooklying Clamshell with Resistance -- Exercise 4   - 1 x daily - 7 x weekly - 1 sets - 5-10 reps  - Supine Transversus Abdominis Bracing - Hands on Stomach  - 1 x daily - 7 x weekly - 1 sets - 10 reps     Assessment:     Pt's response to treatment:   Patient with improvement in pain (pelvic and lumbar).  Doing well progressing to core stabilization exercises and performing more challenging stretching.  Anticipate discharge to [facility] in 2 more session.       Pain end of session: 2/10    Plan:     Continue with current POC/no changes -- manual to pelvic floor as needed    Assessment of current progress against goals:  Progressing toward functional goals    Goals: -- extended LTGs to 9/24 to complete 10 sessions   Active       PT Problem       STGs - 5 sessions (Met)       Start:  04/25/24    Expected End:  06/14/24    Resolved:  08/06/24    1)  Patient will be knowledgeable with regards to downtraining techniques to improve relaxation of pelvic floor muscles  2)  Patient will report 25% less pain during intercourse  3)  Patient will perform diaphragmatic breathing properly to relax and contract pelvic floor muscle appropriately  4)  Patient will demonstrate good transverse abdominis activation to stabilize lumbopelvic girdle during ADLs  5)  Patient will report successful use of urge suppression strategies at least 50% of the time  6)  Patient will be knowledgeable with regards to posture principles to reduce lumbar strain.  7)  Patient will have at least 50% improvement in back pain to allow for performance of ADLs                   LTGs - 10 sessions (Progressing)       Start:  04/25/24    Expected End:  09/24/24       1)  Patient will have increase LE strength by at least 1/2 MMT to improve ability to transfer sit to stand without pain  2)  Patient will report at least  75% improvement in back/hip pan to allow for ease of ADLs  3)  Patient will be knowledgeable with proper body mechanics to prevent reoccurrence of symptoms    4)  Patient will increase tolerance to internal penetration for examination and intercourse with min to nil pain  5)  Patient will report >90% improvement in urgency symptoms without incontinence  6)  Patient will feel like she is completely able to empty bowels 90% of the time.    7)  Patient will improve pelvic floor contraction to by 1/2-1 MMT  with coordinated exhale for improved continence          Patient Stated Goal 1 (Progressing)       Start:  04/25/24    Expected End:  09/24/24       Patient would like to correct pelvic floor dysfunction, improve posture, correct cervical lordosis

## 2024-08-20 ENCOUNTER — TREATMENT (OUTPATIENT)
Dept: PHYSICAL THERAPY | Facility: CLINIC | Age: 34
End: 2024-08-20
Payer: COMMERCIAL

## 2024-08-20 DIAGNOSIS — R10.2 PELVIC PAIN: ICD-10-CM

## 2024-08-20 PROCEDURE — 97140 MANUAL THERAPY 1/> REGIONS: CPT | Mod: GP | Performed by: PHYSICAL THERAPIST

## 2024-08-20 PROCEDURE — 97110 THERAPEUTIC EXERCISES: CPT | Mod: GP | Performed by: PHYSICAL THERAPIST

## 2024-08-20 NOTE — PROGRESS NOTES
Physical Therapy Treatment/Progress Note    Patient Name: Deena Gómez  MRN: [id number]  Encounter date:  8/20/2024  Time Calculation  Start Time: 0930  Stop Time: 1015  Time Calculation (min): 45 min     PT Therapeutic Procedures Time Entry  Manual Therapy Time Entry: 20  Therapeutic Exercise Time Entry: 25    Visit Number:  9 (including evaluation)  Planned total visits: 10 per POC  Visits Authorized/Insurance Coverage:  3200 DED- MET , 100% COVERAGE, 40V PT, NO AUTH, MMO REF # [id number]    Current Problem  Problem List Items Addressed This Visit             ICD-10-CM    Pelvic pain R10.2     Precautions     No precautions  Pain     Less back pain 1/10  Pelvic pain is more tightness;      Subjective  General     Pain with intercourse depends on the point of the month;  overall feeling stable with pains and functions.  Next session plans to be last session until needed again    Objective  Tightness in adductors L>R    Treatment:  Therapeutic Exercise:    With use of red theraball happy baby 10 sec x 3  Piriformis stretch wide and narrow 3 x 10 sec   Adductor side lunge stretch 10 sec x 3   Deep squat stretch -- trial of sitting on yoga block if needed;    Manual:    Adductor and piriformis release in supine and sidelying.      Current HEP:  Access Code: [email or web address]  URL: [email or web address]  Date: 05/21/2024  Prepared by: Nicci Ratliff    Exercises  - Supine Hip adduction isometric with Ball -- Exercise 1   - 1 x daily - 7 x weekly - 1 sets - 5 reps - 5sec hold  - Hooklying SI Joint Self-Correction  - 1 x daily - 7 x weekly - 1 sets - 5 reps - 5 sec hold  - Hooklying Clamshell with Resistance -- Exercise 4   - 1 x daily - 7 x weekly - 1 sets - 5-10 reps  - Supine Transversus Abdominis Bracing - Hands on Stomach  - 1 x daily - 7 x weekly - 1 sets - 10 reps     Assessment:     Pt's response to treatment:   Patient feeling positive about how pain is more stable in pelvic floor and back.  Getting  mattress topper in hopes of this helping reduce nighttime stiffness.  Good performance of stretching and strengthening.  Patient having hormones checked to make sure imbalances are addressed.  Anticipate discharge to [facility] in 1 more session.       Pain end of session: 0/10    Plan:     Continue with current POC/no changes -- manual to pelvic floor as needed    Assessment of current progress against goals:  Progressing toward functional goals    Goals: -- extended LTGs to 9/24 to complete 10 sessions   Active       PT Problem       STGs - 5 sessions (Met)       Start:  04/25/24    Expected End:  06/14/24    Resolved:  08/06/24    1)  Patient will be knowledgeable with regards to downtraining techniques to improve relaxation of pelvic floor muscles  2)  Patient will report 25% less pain during intercourse  3)  Patient will perform diaphragmatic breathing properly to relax and contract pelvic floor muscle appropriately  4)  Patient will demonstrate good transverse abdominis activation to stabilize lumbopelvic girdle during ADLs  5)  Patient will report successful use of urge suppression strategies at least 50% of the time  6)  Patient will be knowledgeable with regards to posture principles to reduce lumbar strain.  7)  Patient will have at least 50% improvement in back pain to allow for performance of ADLs                   LTGs - 10 sessions (Progressing)       Start:  04/25/24    Expected End:  09/24/24       1)  Patient will have increase LE strength by at least 1/2 MMT to improve ability to transfer sit to stand without pain  2)  Patient will report at least 75% improvement in back/hip pan to allow for ease of ADLs  3)  Patient will be knowledgeable with proper body mechanics to prevent reoccurrence of symptoms    4)  Patient will increase tolerance to internal penetration for examination and intercourse with min to nil pain  5)  Patient will report >90% improvement in urgency symptoms without incontinence  6)   Patient will feel like she is completely able to empty bowels 90% of the time.    7)  Patient will improve pelvic floor contraction to by 1/2-1 MMT  with coordinated exhale for improved continence          Patient Stated Goal 1 (Progressing)       Start:  04/25/24    Expected End:  09/24/24       Patient would like to correct pelvic floor dysfunction, improve posture, correct cervical lordosis

## 2024-08-22 ENCOUNTER — APPOINTMENT (OUTPATIENT)
Dept: ALLERGY | Facility: CLINIC | Age: 34
End: 2024-08-22
Payer: COMMERCIAL

## 2024-08-22 VITALS
TEMPERATURE: 98.2 F | OXYGEN SATURATION: 98 % | BODY MASS INDEX: 23.19 KG/M2 | WEIGHT: 126 LBS | SYSTOLIC BLOOD PRESSURE: 106 MMHG | HEART RATE: 69 BPM | DIASTOLIC BLOOD PRESSURE: 70 MMHG | RESPIRATION RATE: 17 BRPM | HEIGHT: 62 IN

## 2024-08-22 DIAGNOSIS — D72.19 PERIPHERAL EOSINOPHILIA: ICD-10-CM

## 2024-08-22 DIAGNOSIS — D83.9 CVID (COMMON VARIABLE IMMUNODEFICIENCY) (MULTI): ICD-10-CM

## 2024-08-22 DIAGNOSIS — G93.39 OTHER POST INFECTION AND RELATED FATIGUE SYNDROMES: Primary | ICD-10-CM

## 2024-08-22 PROCEDURE — 99214 OFFICE O/P EST MOD 30 MIN: CPT | Performed by: ALLERGY & IMMUNOLOGY

## 2024-08-22 PROCEDURE — 3008F BODY MASS INDEX DOCD: CPT | Performed by: ALLERGY & IMMUNOLOGY

## 2024-08-22 NOTE — PROGRESS NOTES
"Patient ID: Deena Gómez \"Braydon" is a 33 y.o. female.     Chief Complaint: follow up, last seen 2/2024  History Of Present Illness  Deena Gómez \"Braydon" is a 33 y.o. female with PMx CVID, fatigue, SIBO presenting for follow up.     Patient has had a relapse of her fatigue symptoms. Prior to this she felt that her fatigue was better.  Now with brain fog. Plans to go to functional medicine for this.  She has had a lot of stress.  Continues with infusions of Cuvitru 15 gm q 2 wk.  She does feel that the Cuvitru helps her feel less fatigue and she notes a couple of days prior to the infusion that she is ready for it.  She also reports she has been following with functional medicine practitioner who is remote.  She notes she was diagnosed with elevated IGG for EBV which she has on her phone, but we discussed this to not be accurate due to her IGG infusions.    Food Allergy  No    Eczema/ Atopic Dermatitis  No    Asthma  No    Rhinoconjunctivitis  No    Drug Allergy   No    Insect Allergy   No    Infections  No history of frequent or recurrent infections      Review of Systems    Pertinent positives and negatives have been assessed in the HPI. All other systems have been reviewed and are negative except as noted in the HPI.    Allergies  Patient has no known allergies.    Past Medical History  She has a past medical history of Migraine, unspecified, not intractable, without status migrainosus and Personal history of other diseases of the musculoskeletal system and connective tissue.    Family History  Family History   Problem Relation Name Age of Onset    Basal cell carcinoma Mother      Hypertension Mother      Other (DJD-C SPINE) Mother      Cancer Mother      Pancreatic cancer Paternal Grandfather  86    Kidney cancer Paternal Grandfather  86    Cancer Paternal Grandfather         Surgical History  She has a past surgical history that includes Other surgical history (06/17/2020).    Social/Environmental " "History  She has no history on file for tobacco use, alcohol use, and drug use.      MEDICATIONS  Current Outpatient Medications on File Prior to Visit   Medication Sig Dispense Refill    immun glob G,IgG,-gly-IgA ov50 (Cuvitru) 1 gram/5 mL (20 %) solution Inject 15 g under the skin every 14 (fourteen) days.      [DISCONTINUED] cholecalciferol (Vitamin D-3) 1,250 mcg (50,000 unit) capsule TAKE 1 CAPSULE Weekly      [DISCONTINUED] collagenase Clostridium hist. (collagenase clos hist., bulk,) powder USE AS DIRECTED.      [DISCONTINUED] multivitamin tablet 1 tablet Orally Once a day for 30 day(s)      [DISCONTINUED] sertraline (Zoloft) 50 mg tablet 1.5 tablet Orally Once a day for 90 day(s) 90 tablet 3     No current facility-administered medications on file prior to visit.     Physical Exam  Visit Vitals  /70   Pulse 69   Temp 36.8 °C (98.2 °F) (Temporal)   Resp 17   Ht 1.575 m (5' 2\")   Wt 57.2 kg (126 lb)   SpO2 98%   BMI 23.05 kg/m²   BSA 1.58 m²       Wt Readings from Last 1 Encounters:   08/22/24 57.2 kg (126 lb)       Physical Exam    General: Well appearing, no acute distress  Head: Normocephalic, atraumatic, neck supple without lymphadenopathy  Eyes: EOMI, non-injected  Nose: No nasal crease, nares patent, slightly boggy turbinates, minimal discharge  Throat: Normal dentition, no erythema  Heart: Regular rate and rhythm  Lungs: Clear to auscultation bilaterally, effort normal  Abdomen: Soft, non-tender, normal bowel sounds, no hsm  Extremities: Moves all extremities symmetrically, no edema  Skin: No rashes/lesions  Psych: normal mood and affect    LAB RESULTS:  CBC:  Recent Labs     12/19/22  1800 06/25/21  1046 06/11/21  1101 12/16/20  1209   WBC 5.7 3.4* 4.6 4.7   HGB 12.8 13.6 14.0 12.7   HCT 37.8 39.3 40.6 38.3    158 183 171   MCV 93 99 98 100   EOSABS 0.00 0.00  --  0.00       CMP:  Recent Labs     02/15/24  1213 09/08/23  0928 12/22/22  1658 06/19/20  0733 10/31/19  0915 06/01/19  0920   NA " 139 142 138   < > 142 142   K 4.7 4.3 4.5   < > 4.8 5.1    105 103   < > 106 105   CO2 23 25 25   < > 22* 19*   ANIONGAP 17 16 15   < > 14 18   BUN 23 11 17   < > 14 14   CREATININE 0.87 0.69 0.81   < > 0.8 1.0   EGFR 90  --   --   --  91 70   MG  --  2.14  --   --   --   --     < > = values in this interval not displayed.     Recent Labs     12/19/22  1800 06/19/20  0733 06/01/19  0920   ALBUMIN 4.5 4.4 4.4   ALKPHOS 43 37 46   ALT 7 12 15   AST 11 15 18   BILITOT 0.5 0.6 0.3       Recent Labs     12/19/22  1800 06/25/21  1046 12/16/20  1209   EOSABS 0.00 0.00 0.00       IMMUNO:   Recent Labs     02/15/24  1213 09/08/23  0928 12/22/22  1658   IGG 1,460 1,510 1,530   IGA 55* 55* 55*   IGM 84 102 105         HEME/ENDO:  Recent Labs     12/19/22  1800 08/05/21  1229 06/19/20  0733   TSH 2.11 1.82 3.39         Assessment/Plan   Renee is a 32 yo with a history of CVID and chronic fatigue.  She does feel her infusions continue to help and she is not having side effects other than local scar tissues.  She would like to continue at the current dose as she has tolerated without infections well.  She also sees functional medicine which we discussed. She reported labs showing positive to EBV. On review of these labs on her phone they are IGG and I discussed with her that this may reflect her infusions as IGG antibodies are not accurate for patients on IGG infusions.   -will some additional routine labs and call results.  -Plan follow up in 6 months  Hallie Monge,

## 2024-08-29 ENCOUNTER — LAB (OUTPATIENT)
Dept: LAB | Facility: LAB | Age: 34
End: 2024-08-29
Payer: COMMERCIAL

## 2024-08-29 DIAGNOSIS — G93.39 OTHER POST INFECTION AND RELATED FATIGUE SYNDROMES: ICD-10-CM

## 2024-08-29 DIAGNOSIS — D83.9 CVID (COMMON VARIABLE IMMUNODEFICIENCY) (MULTI): ICD-10-CM

## 2024-08-29 DIAGNOSIS — D72.19 PERIPHERAL EOSINOPHILIA: ICD-10-CM

## 2024-08-29 LAB
APPEARANCE UR: CLEAR
BASOPHILS # BLD AUTO: 0 X10*3/UL (ref 0–0.1)
BASOPHILS NFR BLD AUTO: 0 %
BILIRUB UR STRIP.AUTO-MCNC: NEGATIVE MG/DL
COLOR UR: COLORLESS
EOSINOPHIL # BLD AUTO: 0 X10*3/UL (ref 0–0.7)
EOSINOPHIL NFR BLD AUTO: 0 %
ERYTHROCYTE [DISTWIDTH] IN BLOOD BY AUTOMATED COUNT: 11.8 % (ref 11.5–14.5)
GLUCOSE UR STRIP.AUTO-MCNC: NORMAL MG/DL
HCT VFR BLD AUTO: 37.8 % (ref 36–46)
HGB BLD-MCNC: 13.1 G/DL (ref 12–16)
IMM GRANULOCYTES # BLD AUTO: 0.01 X10*3/UL (ref 0–0.7)
IMM GRANULOCYTES NFR BLD AUTO: 0.2 % (ref 0–0.9)
KETONES UR STRIP.AUTO-MCNC: NEGATIVE MG/DL
LEUKOCYTE ESTERASE UR QL STRIP.AUTO: NEGATIVE
LYMPHOCYTES # BLD AUTO: 1.74 X10*3/UL (ref 1.2–4.8)
LYMPHOCYTES NFR BLD AUTO: 32.9 %
MCH RBC QN AUTO: 32.3 PG (ref 26–34)
MCHC RBC AUTO-ENTMCNC: 34.7 G/DL (ref 32–36)
MCV RBC AUTO: 93 FL (ref 80–100)
MONOCYTES # BLD AUTO: 0.34 X10*3/UL (ref 0.1–1)
MONOCYTES NFR BLD AUTO: 6.4 %
NEUTROPHILS # BLD AUTO: 3.2 X10*3/UL (ref 1.2–7.7)
NEUTROPHILS NFR BLD AUTO: 60.5 %
NITRITE UR QL STRIP.AUTO: NEGATIVE
NRBC BLD-RTO: 0 /100 WBCS (ref 0–0)
PH UR STRIP.AUTO: 5.5 [PH]
PLATELET # BLD AUTO: 180 X10*3/UL (ref 150–450)
PROT UR STRIP.AUTO-MCNC: NEGATIVE MG/DL
RBC # BLD AUTO: 4.06 X10*6/UL (ref 4–5.2)
RBC # UR STRIP.AUTO: NEGATIVE /UL
SP GR UR STRIP.AUTO: 1
UROBILINOGEN UR STRIP.AUTO-MCNC: NORMAL MG/DL
WBC # BLD AUTO: 5.3 X10*3/UL (ref 4.4–11.3)

## 2024-08-29 PROCEDURE — 81003 URINALYSIS AUTO W/O SCOPE: CPT

## 2024-08-29 PROCEDURE — 80053 COMPREHEN METABOLIC PANEL: CPT

## 2024-08-29 PROCEDURE — 87799 DETECT AGENT NOS DNA QUANT: CPT

## 2024-08-29 PROCEDURE — 86038 ANTINUCLEAR ANTIBODIES: CPT

## 2024-08-29 PROCEDURE — 36415 COLL VENOUS BLD VENIPUNCTURE: CPT

## 2024-08-29 PROCEDURE — 82784 ASSAY IGA/IGD/IGG/IGM EACH: CPT

## 2024-08-29 PROCEDURE — 82607 VITAMIN B-12: CPT

## 2024-08-29 PROCEDURE — 85025 COMPLETE CBC W/AUTO DIFF WBC: CPT

## 2024-08-29 PROCEDURE — 82306 VITAMIN D 25 HYDROXY: CPT

## 2024-08-30 LAB
25(OH)D3 SERPL-MCNC: 53 NG/ML (ref 30–100)
ALBUMIN SERPL BCP-MCNC: 4.7 G/DL (ref 3.4–5)
ALP SERPL-CCNC: 47 U/L (ref 33–110)
ALT SERPL W P-5'-P-CCNC: 8 U/L (ref 7–45)
ANA SER QL HEP2 SUBST: NEGATIVE
ANION GAP SERPL CALC-SCNC: 17 MMOL/L (ref 10–20)
AST SERPL W P-5'-P-CCNC: 14 U/L (ref 9–39)
BILIRUB SERPL-MCNC: 0.3 MG/DL (ref 0–1.2)
BUN SERPL-MCNC: 29 MG/DL (ref 6–23)
CALCIUM SERPL-MCNC: 9.4 MG/DL (ref 8.6–10.6)
CHLORIDE SERPL-SCNC: 104 MMOL/L (ref 98–107)
CO2 SERPL-SCNC: 21 MMOL/L (ref 21–32)
CREAT SERPL-MCNC: 0.88 MG/DL (ref 0.5–1.05)
EBV DNA SPEC NAA+PROBE-LOG#: NORMAL {LOG_COPIES}/ML
EGFRCR SERPLBLD CKD-EPI 2021: 89 ML/MIN/1.73M*2
GLUCOSE SERPL-MCNC: 95 MG/DL (ref 74–99)
IGA SERPL-MCNC: 57 MG/DL (ref 70–400)
IGG SERPL-MCNC: 1340 MG/DL (ref 700–1600)
IGM SERPL-MCNC: 82 MG/DL (ref 40–230)
LABORATORY COMMENT REPORT: NOT DETECTED
POTASSIUM SERPL-SCNC: 4.3 MMOL/L (ref 3.5–5.3)
PROT SERPL-MCNC: 7.4 G/DL (ref 6.4–8.2)
SODIUM SERPL-SCNC: 138 MMOL/L (ref 136–145)
VIT B12 SERPL-MCNC: 1767 PG/ML (ref 211–911)

## 2024-09-04 ENCOUNTER — TREATMENT (OUTPATIENT)
Dept: PHYSICAL THERAPY | Facility: CLINIC | Age: 34
End: 2024-09-04
Payer: COMMERCIAL

## 2024-09-04 DIAGNOSIS — R10.2 PELVIC PAIN: ICD-10-CM

## 2024-09-04 PROCEDURE — 97530 THERAPEUTIC ACTIVITIES: CPT | Mod: GP | Performed by: PHYSICAL THERAPIST

## 2024-09-04 PROCEDURE — 97110 THERAPEUTIC EXERCISES: CPT | Mod: GP | Performed by: PHYSICAL THERAPIST

## 2024-09-04 NOTE — PROGRESS NOTES
Physical Therapy Treatment/Discharge    Patient Name: Deena Gómez  MRN: [id number]  Encounter date:  9/4/2024  Time Calculation  Start Time: 1205  Stop Time: 1245  Time Calculation (min): 40 min     PT Therapeutic Procedures Time Entry  Therapeutic Exercise Time Entry: 25  Therapeutic Activity Time Entry: 15    Visit Number:  10 (including evaluation)  Planned total visits: 10 per POC  Visits Authorized/Insurance Coverage:  3200 DED- MET , 100% COVERAGE, 40V PT, NO AUTH, MMO REF # [id number]    Current Problem  Problem List Items Addressed This Visit             ICD-10-CM    Pelvic pain R10.2       Precautions     No precautions  Pain     2/10 back      Subjective  General     Pain is generally a 2/10;  Pelvic pain is more tightness;  Overall feeling so uh better and has better understanding of body.  Urinary urgency:  urinates 10-15 times a a day instead of 30+.  Able to have intercourse with minimal to nil pain depend on time of cycle.  Feels like she is able to completely empty bladder.      Objective  4+/5 hip and gluteal stregth, good TA activation    Treatment:  Therapeutic Exercise:    Reviewed exercises, good understanding of balance of stretching and strengthening.  Advised to begin strengthening routine slowly and to make sure stretching is always part of program for pelvic floor tightness  Therapeutic activity:    Reminded of urge suppression strategies, constipation and influence on pelvic floor and vice versa,      Current HEP:  Access Code: [email or web address]  URL: [email or web address]  Date: 05/21/2024  Prepared by: Nicci Ratliff    Exercises  - Supine Hip adduction isometric with Ball -- Exercise 1   - 1 x daily - 7 x weekly - 1 sets - 5 reps - 5sec hold  - Hooklying SI Joint Self-Correction  - 1 x daily - 7 x weekly - 1 sets - 5 reps - 5 sec hold  - Hooklying Clamshell with Resistance -- Exercise 4   - 1 x daily - 7 x weekly - 1 sets - 5-10 reps  - Supine Transversus Abdominis Bracing -  Hands on Stomach  - 1 x daily - 7 x weekly - 1 sets - 10 reps     Assessment:     Pt's response to treatment:   Much improved since beginning therapy.  Still urinating 10-15 times a day but improved from 30+ times a day.  Patient knowledge with exercise progression and believes she can continue independently at this time.       Pain end of session: 0/10    Plan:     Discharge to [facility]    Assessment of current progress against goals:  All goals achieved at this time.      Goals: -- extended LTGs to 9/24 to complete 10 sessions   Resolved       PT Problem       STGs - 5 sessions (Met)       Start:  04/25/24    Expected End:  06/14/24    Resolved:  08/06/24    1)  Patient will be knowledgeable with regards to downtraining techniques to improve relaxation of pelvic floor muscles  2)  Patient will report 25% less pain during intercourse  3)  Patient will perform diaphragmatic breathing properly to relax and contract pelvic floor muscle appropriately  4)  Patient will demonstrate good transverse abdominis activation to stabilize lumbopelvic girdle during ADLs  5)  Patient will report successful use of urge suppression strategies at least 50% of the time  6)  Patient will be knowledgeable with regards to posture principles to reduce lumbar strain.  7)  Patient will have at least 50% improvement in back pain to allow for performance of ADLs                   LTGs - 10 sessions (Met)       Start:  04/25/24    Expected End:  09/24/24    Resolved:  09/05/24    1)  Patient will have increase LE strength by at least 1/2 MMT to improve ability to transfer sit to stand without pain  2)  Patient will report at least 75% improvement in back/hip pan to allow for ease of ADLs  3)  Patient will be knowledgeable with proper body mechanics to prevent reoccurrence of symptoms    4)  Patient will increase tolerance to internal penetration for examination and intercourse with min to nil pain  5)  Patient will report >90% improvement in  urgency symptoms without incontinence  6)  Patient will feel like she is completely able to empty bowels 90% of the time.    7)  Patient will improve pelvic floor contraction to by 1/2-1 MMT  with coordinated exhale for improved continence          Patient Stated Goal 1 (Met)       Start:  04/25/24    Expected End:  09/24/24    Resolved:  09/05/24    Patient would like to correct pelvic floor dysfunction, improve posture, correct cervical lordosis

## 2025-02-17 DIAGNOSIS — D83.9 CVID (COMMON VARIABLE IMMUNODEFICIENCY): Primary | ICD-10-CM

## 2025-02-17 DIAGNOSIS — D72.19 PERIPHERAL EOSINOPHILIA: ICD-10-CM

## 2025-02-19 LAB
ANION GAP SERPL CALCULATED.4IONS-SCNC: 7 MMOL/L (CALC) (ref 7–17)
BUN SERPL-MCNC: 20 MG/DL (ref 7–25)
BUN/CREAT SERPL: NORMAL (CALC) (ref 6–22)
CALCIUM SERPL-MCNC: 8.9 MG/DL (ref 8.6–10.2)
CHLORIDE SERPL-SCNC: 104 MMOL/L (ref 98–110)
CO2 SERPL-SCNC: 26 MMOL/L (ref 20–32)
CREAT SERPL-MCNC: 0.79 MG/DL (ref 0.5–0.97)
EGFRCR SERPLBLD CKD-EPI 2021: 101 ML/MIN/1.73M2
GLUCOSE SERPL-MCNC: 80 MG/DL (ref 65–99)
IGA SERPL-MCNC: 53 MG/DL (ref 47–310)
IGG SERPL-MCNC: 1380 MG/DL (ref 600–1640)
IGM SERPL-MCNC: 98 MG/DL (ref 50–300)
POTASSIUM SERPL-SCNC: 4.2 MMOL/L (ref 3.5–5.3)
SODIUM SERPL-SCNC: 137 MMOL/L (ref 135–146)
VIT B12 SERPL-MCNC: 1608 PG/ML (ref 200–1100)

## 2025-02-20 ENCOUNTER — OFFICE VISIT (OUTPATIENT)
Dept: ALLERGY | Facility: CLINIC | Age: 35
End: 2025-02-20
Payer: COMMERCIAL

## 2025-02-20 VITALS
HEART RATE: 70 BPM | OXYGEN SATURATION: 98 % | SYSTOLIC BLOOD PRESSURE: 106 MMHG | HEIGHT: 62 IN | TEMPERATURE: 98.3 F | BODY MASS INDEX: 22.63 KG/M2 | RESPIRATION RATE: 17 BRPM | DIASTOLIC BLOOD PRESSURE: 68 MMHG | WEIGHT: 123 LBS

## 2025-02-20 DIAGNOSIS — D83.9 CVID (COMMON VARIABLE IMMUNODEFICIENCY): Primary | ICD-10-CM

## 2025-02-20 PROCEDURE — 3008F BODY MASS INDEX DOCD: CPT | Performed by: ALLERGY & IMMUNOLOGY

## 2025-02-20 PROCEDURE — 99213 OFFICE O/P EST LOW 20 MIN: CPT | Performed by: ALLERGY & IMMUNOLOGY

## 2025-02-20 RX ORDER — CREATINE MONOHYDRATE 100 %
POWDER (GRAM) MISCELLANEOUS DAILY
COMMUNITY

## 2025-02-20 RX ORDER — MAGNESIUM 250 MG
1 TABLET ORAL DAILY
COMMUNITY

## 2025-02-20 RX ORDER — CHOLECALCIFEROL (VITAMIN D3) 25 MCG
4 TABLET ORAL DAILY
COMMUNITY

## 2025-02-20 NOTE — PROGRESS NOTES
"Patient ID: Deena Gómez \"Braydon" is a 34 y.o. female.     Chief Complaint: Follow up. Last seen 8/2024  History Of Present Illness  Deena Gómez \"Braydon" is a 34 y.o. female with PMx CVID, SIBO presenting for food allergy.         Food Allergy  No    Eczema/ Atopic Dermatitis  No    Asthma  No    Rhinoconjunctivitis  No    Drug Allergy   No    Insect Allergy   No    Infections  Infusions going well. Using her abdomen and upper buttocks.  Cuvitru 15 gm q 2 wks-needs new PA because she changed insurance.    High B12-patient feels likely related to organ meat in diet.  She takes capsules as a supplement that she orders from [employer] online.      Review of Systems    Pertinent positives and negatives have been assessed in the HPI. All other systems have been reviewed and are negative except as noted in the HPI.    Allergies  Patient has no known allergies.    Past Medical History  She has a past medical history of Migraine, unspecified, not intractable, without status migrainosus and Personal history of other diseases of the musculoskeletal system and connective tissue.    Family History  Family History   Problem Relation Name Age of Onset    Basal cell carcinoma Mother      Hypertension Mother      Other (DJD-C SPINE) Mother      Cancer Mother      Pancreatic cancer Paternal Grandfather  86    Kidney cancer Paternal Grandfather  86    Cancer Paternal Grandfather           Surgical History  She has a past surgical history that includes Other surgical history (06/17/2020).    Social/Environmental History  She has no history on file for tobacco use, alcohol use, and drug use.        MEDICATIONS  Current Outpatient Medications on File Prior to Visit   Medication Sig Dispense Refill    B.animalis,bifid,infantis,long (PROBIOTIC 4X ORAL) Take by mouth once daily.      cholecalciferol (Vitamin D3) 25 mcg (1000 units) tablet Take 4 Units by mouth once daily. Takes through winter months      creatine 100 % " "powder powder once daily.      immun glob G,IgG,-gly-IgA ov50 (Cuvitru) 1 gram/5 mL (20 %) solution Inject 15 g under the skin every 14 (fourteen) days.      magnesium 250 mg tablet Take 1 tablet (250 mg) by mouth once daily. Takes through winter months      prenatal no115/iron/folic acid (PRENATAL 19 ORAL) Take by mouth once every 24 hours.       No current facility-administered medications on file prior to visit.         Physical Exam  Visit Vitals  /68   Pulse 70   Temp 36.8 °C (98.3 °F) (Temporal)   Resp 17   Ht 1.575 m (5' 2\")   Wt 55.8 kg (123 lb)   SpO2 98%   BMI 22.50 kg/m²   BSA 1.56 m²       Wt Readings from Last 1 Encounters:   02/20/25 55.8 kg (123 lb)       Physical Exam    General: Well appearing, no acute distress  Head: Normocephalic, atraumatic, neck supple without lymphadenopathy  Eyes: EOMI, non-injected  Nose: No nasal crease, nares patent, slightly boggy turbinates, minimal discharge  Throat: Normal dentition, no erythema  Heart: Regular rate and rhythm  Lungs: Clear to auscultation bilaterally, effort normal  Abdomen: Soft, non-tender, normal bowel sounds, no hsm  Extremities: Moves all extremities symmetrically, no edema  Skin: No rashes/lesions  Psych: normal mood and affect    LAB RESULTS:  CBC:  Recent Labs     08/29/24  1453 12/19/22  1800 06/25/21  1046   WBC 5.3 5.7 3.4*   HGB 13.1 12.8 13.6   HCT 37.8 37.8 39.3    181 158   MCV 93 93 99   EOSABS 0.00 0.00 0.00       CMP:  Recent Labs     02/18/25  1318 08/29/24  1453 02/15/24  1213 09/08/23  0928    138 139 142   K 4.2 4.3 4.7 4.3    104 104 105   CO2 26 21 23 25   ANIONGAP 7 17 17 16   BUN 20 29* 23 11   CREATININE 0.79 0.88 0.87 0.69   EGFR 101 89 90  --    MG  --   --   --  2.14     Recent Labs     08/29/24  1453 12/19/22  1800 06/19/20  0733   ALBUMIN 4.7 4.5 4.4   ALKPHOS 47 43 37   ALT 8 7 12   AST 14 11 15   BILITOT 0.3 0.5 0.6     Recent Labs     08/29/24  1453 12/19/22  1800 06/25/21  1046   EOSABS 0.00 " 0.00 0.00       IMMUNO:   Recent Labs     02/18/25  1318 08/29/24  1453 02/15/24  1213   IGG 1,380 1,340 1,460   IGA 53 57* 55*   IGM 98 82 84       HEME/ENDO:  Recent Labs     12/19/22  1800 08/05/21  1229 06/19/20  0733   TSH 2.11 1.82 3.39         Assessment/Plan   Renee is a 33 yo with a history of CVID, SIBO. She is doing well without side effects on Cuvitru 15 gm q 2 wks.  She is very strict with her healthy diet.  Continue current therapy and follow up in 6 months.    Hallie Monge DO

## 2025-03-20 ENCOUNTER — APPOINTMENT (OUTPATIENT)
Dept: ALLERGY | Facility: CLINIC | Age: 35
End: 2025-03-20
Payer: COMMERCIAL

## 2025-03-31 ENCOUNTER — APPOINTMENT (OUTPATIENT)
Dept: PRIMARY CARE | Facility: CLINIC | Age: 35
End: 2025-03-31
Payer: COMMERCIAL

## 2025-04-30 ENCOUNTER — OFFICE VISIT (OUTPATIENT)
Dept: PRIMARY CARE | Facility: CLINIC | Age: 35
End: 2025-04-30
Payer: COMMERCIAL

## 2025-04-30 VITALS
BODY MASS INDEX: 24.29 KG/M2 | DIASTOLIC BLOOD PRESSURE: 78 MMHG | HEART RATE: 75 BPM | SYSTOLIC BLOOD PRESSURE: 118 MMHG | WEIGHT: 132 LBS | HEIGHT: 62 IN | OXYGEN SATURATION: 97 % | RESPIRATION RATE: 16 BRPM

## 2025-04-30 DIAGNOSIS — E06.3 HASHIMOTO'S DISEASE: ICD-10-CM

## 2025-04-30 DIAGNOSIS — Z00.00 ROUTINE GENERAL MEDICAL EXAMINATION AT A HEALTH CARE FACILITY: Primary | ICD-10-CM

## 2025-04-30 DIAGNOSIS — E73.9 LACTOSE INTOLERANCE: ICD-10-CM

## 2025-04-30 DIAGNOSIS — G89.29 CHRONIC MIDLINE THORACIC BACK PAIN: ICD-10-CM

## 2025-04-30 DIAGNOSIS — D83.9 CVID (COMMON VARIABLE IMMUNODEFICIENCY): ICD-10-CM

## 2025-04-30 DIAGNOSIS — M54.6 CHRONIC MIDLINE THORACIC BACK PAIN: ICD-10-CM

## 2025-04-30 DIAGNOSIS — E55.9 VITAMIN D DEFICIENCY: ICD-10-CM

## 2025-04-30 PROCEDURE — 3008F BODY MASS INDEX DOCD: CPT | Performed by: INTERNAL MEDICINE

## 2025-04-30 PROCEDURE — 99395 PREV VISIT EST AGE 18-39: CPT | Performed by: INTERNAL MEDICINE

## 2025-04-30 ASSESSMENT — ENCOUNTER SYMPTOMS
FEVER: 0
WEAKNESS: 0
NERVOUS/ANXIOUS: 0
RHINORRHEA: 0
NAUSEA: 0
DEPRESSION: 0
SLEEP DISTURBANCE: 0
DYSURIA: 0
VOMITING: 0
FATIGUE: 0
CHILLS: 0
DIARRHEA: 0
HEMATURIA: 0
DIFFICULTY URINATING: 0
BACK PAIN: 1
PALPITATIONS: 0
COUGH: 0
JOINT SWELLING: 0
ABDOMINAL PAIN: 0
FREQUENCY: 0
OCCASIONAL FEELINGS OF UNSTEADINESS: 0
SINUS PAIN: 0
SORE THROAT: 0
SHORTNESS OF BREATH: 0
ARTHRALGIAS: 0
LOSS OF SENSATION IN FEET: 0
HEADACHES: 0
APPETITE CHANGE: 0
DIZZINESS: 0
CONSTIPATION: 0

## 2025-04-30 ASSESSMENT — PATIENT HEALTH QUESTIONNAIRE - PHQ9
2. FEELING DOWN, DEPRESSED OR HOPELESS: NOT AT ALL
SUM OF ALL RESPONSES TO PHQ9 QUESTIONS 1 AND 2: 0
1. LITTLE INTEREST OR PLEASURE IN DOING THINGS: NOT AT ALL

## 2025-04-30 ASSESSMENT — PAIN SCALES - GENERAL: PAINLEVEL_OUTOF10: 0-NO PAIN

## 2025-04-30 NOTE — PATIENT INSTRUCTIONS
Recommended consuming 800 milligrams of calcium daily in the form of a calcium supplement (calcium citrate).

## 2025-04-30 NOTE — PROGRESS NOTES
"Subjective   Patient ID: Renee Gómez is a 34 y.o. female who presents for Annual Exam and Establish Care.    Reports that her mother had early onset osteopenia. Follows a non-dairy diet and gets \"bone pains\" on her spine. Sometimes, the back pains make it difficult to sleep. Works a sedentary job. Takes a multivitamin. Sees ophthalmologist and dentist regularly. Is up to date on TDAP vaccine.     Diagnostics Reviewed: 6/16/2022 PAP: was negative.   Labs Reviewed: 2/18/2025 Blood Work: vitamin B12 1,608 but otherwise normal.          Review of Systems   Constitutional:  Negative for appetite change, chills, fatigue and fever.   HENT:  Negative for ear pain, rhinorrhea, sinus pain and sore throat.    Eyes:  Negative for visual disturbance.   Respiratory:  Negative for cough and shortness of breath.    Cardiovascular:  Negative for chest pain and palpitations.   Gastrointestinal:  Negative for abdominal pain, constipation, diarrhea, nausea and vomiting.   Genitourinary:  Negative for difficulty urinating, dysuria, frequency and hematuria.   Musculoskeletal:  Positive for back pain. Negative for arthralgias and joint swelling.   Skin:  Negative for rash.   Neurological:  Negative for dizziness, weakness and headaches.   Psychiatric/Behavioral:  Negative for sleep disturbance. The patient is not nervous/anxious.      Objective   /78   Pulse 75   Resp 16   Ht 1.575 m (5' 2\")   Wt 59.9 kg (132 lb)   LMP 04/11/2025   SpO2 97%   BMI 24.14 kg/m²     Physical Exam  HENT:      Right Ear: Tympanic membrane normal. There is no impacted cerumen.      Left Ear: Tympanic membrane normal. There is no impacted cerumen.      Mouth/Throat:      Pharynx: Oropharynx is clear. No oropharyngeal exudate.   Eyes:      Conjunctiva/sclera: Conjunctivae normal.      Pupils: Pupils are equal, round, and reactive to light.   Neck:      Thyroid: No thyromegaly.      Vascular: No carotid bruit.   Cardiovascular:      Rate and " Rhythm: Regular rhythm.      Heart sounds: Normal heart sounds.   Pulmonary:      Breath sounds: Normal breath sounds.   Chest:   Breasts:     Right: Normal.      Left: Normal.   Abdominal:      Palpations: Abdomen is soft. There is no hepatomegaly.      Tenderness: There is no abdominal tenderness.   Musculoskeletal:      Right lower leg: No edema.      Left lower leg: No edema.   Lymphadenopathy:      Cervical: No cervical adenopathy.   Skin:     General: Skin is warm.      Comments: No suspicious moles   Neurological:      Mental Status: She is alert and oriented to person, place, and time.      Gait: Gait is intact.   Psychiatric:         Mood and Affect: Mood and affect normal.         Behavior: Behavior normal. Behavior is cooperative.         Cognition and Memory: Cognition normal.         Assessment/Plan   Diagnoses and all orders for this visit:  Routine general medical examination at a health care facility  Vitamin D deficiency  -     XR DEXA bone density; Future  Lactose intolerance  -     XR DEXA bone density; Future  Chronic midline thoracic back pain  -     XR DEXA bone density; Future  CVID (common variable immunodeficiency)  Hashimoto's disease  -     TSH with reflex to Free T4 if abnormal; Future      Scribe Attestation  By signing my name below, IHung Scribe   attest that this documentation has been prepared under the direction and in the presence of Eden Chirinos MD.

## 2025-05-09 ENCOUNTER — HOSPITAL ENCOUNTER (OUTPATIENT)
Dept: RADIOLOGY | Facility: CLINIC | Age: 35
Discharge: HOME | End: 2025-05-09
Payer: COMMERCIAL

## 2025-05-09 DIAGNOSIS — E55.9 VITAMIN D DEFICIENCY: ICD-10-CM

## 2025-05-09 DIAGNOSIS — G89.29 CHRONIC MIDLINE THORACIC BACK PAIN: ICD-10-CM

## 2025-05-09 DIAGNOSIS — M54.6 CHRONIC MIDLINE THORACIC BACK PAIN: ICD-10-CM

## 2025-05-09 DIAGNOSIS — E73.9 LACTOSE INTOLERANCE: ICD-10-CM

## 2025-05-09 LAB — TSH SERPL-ACNC: 2.32 MIU/L

## 2025-05-09 PROCEDURE — 77080 DXA BONE DENSITY AXIAL: CPT

## 2025-07-18 ENCOUNTER — HOSPITAL ENCOUNTER (OUTPATIENT)
Dept: RADIOLOGY | Facility: HOSPITAL | Age: 35
Discharge: HOME | End: 2025-07-18
Payer: COMMERCIAL

## 2025-07-18 ENCOUNTER — LAB REQUISITION (OUTPATIENT)
Dept: LAB | Facility: HOSPITAL | Age: 35
End: 2025-07-18
Payer: COMMERCIAL

## 2025-07-18 ENCOUNTER — OFFICE VISIT (OUTPATIENT)
Dept: PRIMARY CARE | Facility: CLINIC | Age: 35
End: 2025-07-18
Payer: COMMERCIAL

## 2025-07-18 VITALS
HEIGHT: 62 IN | DIASTOLIC BLOOD PRESSURE: 74 MMHG | BODY MASS INDEX: 22.45 KG/M2 | HEART RATE: 60 BPM | RESPIRATION RATE: 16 BRPM | OXYGEN SATURATION: 98 % | SYSTOLIC BLOOD PRESSURE: 120 MMHG | WEIGHT: 122 LBS | TEMPERATURE: 98.2 F

## 2025-07-18 DIAGNOSIS — D83.9 CVID (COMMON VARIABLE IMMUNODEFICIENCY): ICD-10-CM

## 2025-07-18 DIAGNOSIS — R14.0 BLOATING: ICD-10-CM

## 2025-07-18 DIAGNOSIS — R14.0 ABDOMINAL DISTENSION (GASEOUS): ICD-10-CM

## 2025-07-18 DIAGNOSIS — S39.011A STRAIN OF ABDOMINAL MUSCLE, INITIAL ENCOUNTER: ICD-10-CM

## 2025-07-18 DIAGNOSIS — E73.9 LACTOSE INTOLERANCE: ICD-10-CM

## 2025-07-18 DIAGNOSIS — R19.7 DIARRHEA OF PRESUMED INFECTIOUS ORIGIN: ICD-10-CM

## 2025-07-18 DIAGNOSIS — R10.33 PERIUMBILICAL PAIN: ICD-10-CM

## 2025-07-18 DIAGNOSIS — E73.9 LACTOSE INTOLERANCE, UNSPECIFIED: ICD-10-CM

## 2025-07-18 DIAGNOSIS — S39.011S: ICD-10-CM

## 2025-07-18 DIAGNOSIS — R10.33 PERIUMBILICAL ABDOMINAL PAIN: Primary | ICD-10-CM

## 2025-07-18 DIAGNOSIS — R10.33 PERIUMBILICAL ABDOMINAL PAIN: ICD-10-CM

## 2025-07-18 DIAGNOSIS — R19.7 DIARRHEA, UNSPECIFIED: ICD-10-CM

## 2025-07-18 LAB — B-HCG SERPL-ACNC: <2 MIU/ML

## 2025-07-18 PROCEDURE — 99213 OFFICE O/P EST LOW 20 MIN: CPT | Performed by: INTERNAL MEDICINE

## 2025-07-18 PROCEDURE — 74177 CT ABD & PELVIS W/CONTRAST: CPT

## 2025-07-18 PROCEDURE — 84702 CHORIONIC GONADOTROPIN TEST: CPT

## 2025-07-18 PROCEDURE — 2550000001 HC RX 255 CONTRASTS: Performed by: INTERNAL MEDICINE

## 2025-07-18 PROCEDURE — 3008F BODY MASS INDEX DOCD: CPT | Performed by: INTERNAL MEDICINE

## 2025-07-18 RX ADMIN — IOHEXOL 75 ML: 350 INJECTION, SOLUTION INTRAVENOUS at 15:44

## 2025-07-18 ASSESSMENT — PAIN SCALES - GENERAL: PAINLEVEL_OUTOF10: 3

## 2025-07-18 ASSESSMENT — ENCOUNTER SYMPTOMS
SHORTNESS OF BREATH: 0
ARTHRALGIAS: 0
DIZZINESS: 0
PALPITATIONS: 0
NAUSEA: 0
APPETITE CHANGE: 1
CHILLS: 1
DEPRESSION: 0
LOSS OF SENSATION IN FEET: 0
OCCASIONAL FEELINGS OF UNSTEADINESS: 0
DIARRHEA: 1
CONSTIPATION: 0
ABDOMINAL PAIN: 1
COUGH: 0

## 2025-07-18 ASSESSMENT — PATIENT HEALTH QUESTIONNAIRE - PHQ9
SUM OF ALL RESPONSES TO PHQ9 QUESTIONS 1 AND 2: 0
1. LITTLE INTEREST OR PLEASURE IN DOING THINGS: NOT AT ALL
2. FEELING DOWN, DEPRESSED OR HOPELESS: NOT AT ALL

## 2025-07-18 NOTE — PROGRESS NOTES
"Subjective   Patient ID: Renee Gómze is a 34 y.o. female who presents for Abdominal Pain.    Patient has been experiencing a lot of abdominal pain (lower right quadrant), indigestion, loss of appetite, diarrhea and chills. Abdominal pain can be exacerbated with certain positional changes and got worse after gym workout.  Denies vomiting, no blood in stool.     Diagnostics Reviewed:  Labs Reviewed:           Review of Systems   Constitutional:  Positive for appetite change and chills.   Respiratory:  Negative for cough and shortness of breath.    Cardiovascular:  Negative for chest pain and palpitations.   Gastrointestinal:  Positive for abdominal pain and diarrhea. Negative for constipation and nausea.   Musculoskeletal:  Negative for arthralgias.   Neurological:  Negative for dizziness.       Objective   /74   Pulse 60   Temp 36.8 °C (98.2 °F)   Resp 16   Ht 1.575 m (5' 2\")   Wt 55.3 kg (122 lb)   LMP 07/02/2025   SpO2 98%   BMI 22.31 kg/m²     Physical Exam    Cardiovascular:      Rate and Rhythm: Normal rate and regular rhythm.      Heart sounds: Normal heart sounds.   Pulmonary:      Breath sounds: Normal breath sounds.   Abdominal:      Palpations: Abdomen is soft. There is no hepatomegaly.      Tenderness: There is no abdominal tenderness (With palpation of the umbilical area).     Musculoskeletal:      Right lower leg: No edema.      Left lower leg: No edema.     Neurological:      Mental Status: She is alert and oriented to person, place, and time.      Gait: Gait normal.     Psychiatric:         Mood and Affect: Mood normal.         Behavior: Behavior normal.         Assessment/Plan   Diagnoses and all orders for this visit:  Periumbilical abdominal pain  -     CBC and Auto Differential; Future  -     Comprehensive Metabolic Panel; Future  -     HCG, quantitative, pregnancy; Future  -     CT abdomen pelvis w IV contrast; Future  Diarrhea of presumed infectious origin  -     CBC and Auto " Differential; Future  -     Comprehensive Metabolic Panel; Future  -     HCG, quantitative, pregnancy; Future  -     CT abdomen pelvis w IV contrast; Future  CVID (common variable immunodeficiency)  -     CBC and Auto Differential; Future  -     Comprehensive Metabolic Panel; Future  -     HCG, quantitative, pregnancy; Future  -     CT abdomen pelvis w IV contrast; Future  Lactose intolerance  -     CBC and Auto Differential; Future  -     Comprehensive Metabolic Panel; Future  -     HCG, quantitative, pregnancy; Future  -     CT abdomen pelvis w IV contrast; Future  Strain of abdominal muscle, initial encounter  -     CBC and Auto Differential; Future  -     Comprehensive Metabolic Panel; Future  -     HCG, quantitative, pregnancy; Future  -     CT abdomen pelvis w IV contrast; Future  Bloating  -     CBC and Auto Differential; Future  -     Comprehensive Metabolic Panel; Future  -     HCG, quantitative, pregnancy; Future  -     CT abdomen pelvis w IV contrast; Future      Scribe Attestation  By signing my name below, IHung Scribe   attest that this documentation has been prepared under the direction and in the presence of Eden Chirinos MD.

## 2025-07-20 LAB
ALBUMIN SERPL-MCNC: 4.8 G/DL (ref 3.6–5.1)
ALP SERPL-CCNC: 49 U/L (ref 31–125)
ALT SERPL-CCNC: 23 U/L (ref 6–29)
ANION GAP SERPL CALCULATED.4IONS-SCNC: 10 MMOL/L (CALC) (ref 7–17)
AST SERPL-CCNC: 23 U/L (ref 10–30)
BASOPHILS # BLD AUTO: 0 CELLS/UL (ref 0–200)
BASOPHILS NFR BLD AUTO: 0 %
BILIRUB SERPL-MCNC: 0.5 MG/DL (ref 0.2–1.2)
BUN SERPL-MCNC: 13 MG/DL (ref 7–25)
CALCIUM SERPL-MCNC: 9.9 MG/DL (ref 8.6–10.2)
CHLORIDE SERPL-SCNC: 100 MMOL/L (ref 98–110)
CO2 SERPL-SCNC: 25 MMOL/L (ref 20–32)
CREAT SERPL-MCNC: 0.86 MG/DL (ref 0.5–0.97)
EGFRCR SERPLBLD CKD-EPI 2021: 91 ML/MIN/1.73M2
EOSINOPHIL # BLD AUTO: 0 CELLS/UL (ref 15–500)
EOSINOPHIL NFR BLD AUTO: 0 %
ERYTHROCYTE [DISTWIDTH] IN BLOOD BY AUTOMATED COUNT: 12.7 % (ref 11–15)
GLUCOSE SERPL-MCNC: 72 MG/DL (ref 65–99)
HCT VFR BLD AUTO: 40.8 % (ref 35–45)
HGB BLD-MCNC: 13.2 G/DL (ref 11.7–15.5)
LYMPHOCYTES # BLD AUTO: 1470 CELLS/UL (ref 850–3900)
LYMPHOCYTES NFR BLD AUTO: 33.4 %
MCH RBC QN AUTO: 32.8 PG (ref 27–33)
MCHC RBC AUTO-ENTMCNC: 32.4 G/DL (ref 32–36)
MCV RBC AUTO: 101.5 FL (ref 80–100)
MONOCYTES # BLD AUTO: 339 CELLS/UL (ref 200–950)
MONOCYTES NFR BLD AUTO: 7.7 %
NEUTROPHILS # BLD AUTO: 2592 CELLS/UL (ref 1500–7800)
NEUTROPHILS NFR BLD AUTO: 58.9 %
PLATELET # BLD AUTO: 192 THOUSAND/UL (ref 140–400)
PMV BLD REES-ECKER: 10.5 FL (ref 7.5–12.5)
POTASSIUM SERPL-SCNC: 4.8 MMOL/L (ref 3.5–5.3)
PROT SERPL-MCNC: 7.6 G/DL (ref 6.1–8.1)
RBC # BLD AUTO: 4.02 MILLION/UL (ref 3.8–5.1)
SODIUM SERPL-SCNC: 135 MMOL/L (ref 135–146)
WBC # BLD AUTO: 4.4 THOUSAND/UL (ref 3.8–10.8)

## 2025-07-26 ENCOUNTER — OFFICE VISIT (OUTPATIENT)
Dept: URGENT CARE | Age: 35
End: 2025-07-26
Payer: COMMERCIAL

## 2025-07-26 VITALS
SYSTOLIC BLOOD PRESSURE: 124 MMHG | BODY MASS INDEX: 23 KG/M2 | WEIGHT: 125 LBS | OXYGEN SATURATION: 100 % | DIASTOLIC BLOOD PRESSURE: 86 MMHG | RESPIRATION RATE: 16 BRPM | TEMPERATURE: 98.3 F | HEIGHT: 62 IN | HEART RATE: 67 BPM

## 2025-07-26 DIAGNOSIS — S00.06XA TICK BITE OF SCALP, INITIAL ENCOUNTER: Primary | ICD-10-CM
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
## 2025-07-26 PROCEDURE — 99203 OFFICE O/P NEW LOW 30 MIN: CPT | Performed by: NURSE PRACTITIONER

## 2025-07-26 PROCEDURE — 3008F BODY MASS INDEX DOCD: CPT | Performed by: NURSE PRACTITIONER

## 2025-07-26 PROCEDURE — 1036F TOBACCO NON-USER: CPT | Performed by: NURSE PRACTITIONER

## 2025-07-26 RX ORDER — DOXYCYCLINE HYCLATE 100 MG
200 TABLET ORAL ONCE
Qty: 2 TABLET | Refills: 0 | Status: SHIPPED | OUTPATIENT
Start: 2025-07-26 | End: 2025-07-26

## 2025-07-26 ASSESSMENT — PAIN SCALES - GENERAL: PAINLEVEL_OUTOF10: 0-NO PAIN

## 2025-07-26 NOTE — PROGRESS NOTES
"Subjective   Patient ID: Deena Gómez \"Braydon" is a 34 y.o. female. They present today with a chief complaint of tick bite to head (Patient states the tick was on the back of her head she removed it around 10am. ).    History of Present Illness  Removed the tick herself - was intact less than 24 hours inbedded and not engorged          Past Medical History  Allergies as of 07/26/2025    (No Known Allergies)       Prescriptions Prior to Admission[1]     Medical History[2]    Surgical History[3]     reports that she has never smoked. She has never been exposed to tobacco smoke. She has never used smokeless tobacco. She reports that she does not drink alcohol and does not use drugs.    Review of Systems  Review of Systems   All other systems reviewed and are negative.                                 Objective    Vitals:    07/26/25 1237   BP: 124/86   BP Location: Left arm   Patient Position: Sitting   Pulse: 67   Resp: 16   Temp: 36.8 °C (98.3 °F)   TempSrc: Oral   SpO2: 100%   Weight: 56.7 kg (125 lb)   Height: 1.575 m (5' 2\")     Patient's last menstrual period was 07/02/2025.    Physical Exam  Vitals reviewed.   Constitutional:       Appearance: Normal appearance.   Pulmonary:      Effort: Pulmonary effort is normal.     Skin:     Comments: Scalp skin is intact and no erythema or scab from the removal     Neurological:      Mental Status: She is alert.         Procedures    Point of Care Test & Imaging Results from this visit  No results found for this visit on 07/26/25.   Imaging  No results found.    Cardiology, Vascular, and Other Imaging  No other imaging results found for the past 2 days      Diagnostic study results (if any) were reviewed by NICKI Lo.    Assessment/Plan   Allergies, medications, history, and pertinent labs/EKGs/Imaging reviewed by NICKI Lo.     Medical Decision Making  Will start deoxy prophylaxis based on pts immuno compromised dx - pt has " requested    Orders and Diagnoses  Encounter Diagnosis   Name Primary?    Tick bite of scalp, initial encounter Yes         Medical Admin Record      Patient disposition: Home    Electronically signed by NICKI Lo  12:57 PM           [1] (Not in a hospital admission)  [2]   Past Medical History:  Diagnosis Date    Migraine, unspecified, not intractable, without status migrainosus     Migraines    Personal history of other diseases of the musculoskeletal system and connective tissue     History of fibromyalgia   [3] No past surgical history on file.

## 2025-08-21 ENCOUNTER — APPOINTMENT (OUTPATIENT)
Dept: ALLERGY | Facility: CLINIC | Age: 35
End: 2025-08-21
Payer: COMMERCIAL

## 2025-08-25 ENCOUNTER — APPOINTMENT (OUTPATIENT)
Dept: ALLERGY | Facility: CLINIC | Age: 35
End: 2025-08-25
Payer: COMMERCIAL

## 2025-08-25 VITALS
DIASTOLIC BLOOD PRESSURE: 70 MMHG | BODY MASS INDEX: 23.22 KG/M2 | HEIGHT: 62 IN | SYSTOLIC BLOOD PRESSURE: 110 MMHG | OXYGEN SATURATION: 98 % | WEIGHT: 126.2 LBS | RESPIRATION RATE: 18 BRPM | TEMPERATURE: 98.2 F | HEART RATE: 74 BPM

## 2025-08-25 DIAGNOSIS — D83.9 CVID (COMMON VARIABLE IMMUNODEFICIENCY): Primary | ICD-10-CM

## 2025-08-25 DIAGNOSIS — J98.8 RECURRENT RESPIRATORY INFECTION: ICD-10-CM

## 2025-08-25 DIAGNOSIS — K63.8219 SMALL INTESTINAL BACTERIAL OVERGROWTH (SIBO): ICD-10-CM

## 2025-08-25 PROCEDURE — 3008F BODY MASS INDEX DOCD: CPT | Performed by: ALLERGY & IMMUNOLOGY

## 2025-08-25 PROCEDURE — 99214 OFFICE O/P EST MOD 30 MIN: CPT | Performed by: ALLERGY & IMMUNOLOGY

## 2025-08-26 LAB
ANION GAP SERPL CALCULATED.4IONS-SCNC: 12 MMOL/L (CALC) (ref 7–17)
BASOPHILS # BLD AUTO: 9 CELLS/UL (ref 0–200)
BASOPHILS NFR BLD AUTO: 0.2 %
BUN SERPL-MCNC: 22 MG/DL (ref 7–25)
BUN/CREAT SERPL: NORMAL (CALC) (ref 6–22)
CALCIUM SERPL-MCNC: 9.4 MG/DL (ref 8.6–10.2)
CHLORIDE SERPL-SCNC: 104 MMOL/L (ref 98–110)
CO2 SERPL-SCNC: 23 MMOL/L (ref 20–32)
CREAT SERPL-MCNC: 0.84 MG/DL (ref 0.5–0.97)
EGFRCR SERPLBLD CKD-EPI 2021: 93 ML/MIN/1.73M2
EOSINOPHIL # BLD AUTO: 0 CELLS/UL (ref 15–500)
EOSINOPHIL NFR BLD AUTO: 0 %
ERYTHROCYTE [DISTWIDTH] IN BLOOD BY AUTOMATED COUNT: 11.9 % (ref 11–15)
GLUCOSE SERPL-MCNC: 87 MG/DL (ref 65–99)
HCT VFR BLD AUTO: 37.1 % (ref 35–45)
HGB BLD-MCNC: 12.3 G/DL (ref 11.7–15.5)
IGA SERPL-MCNC: 52 MG/DL (ref 47–310)
IGG SERPL-MCNC: 1300 MG/DL (ref 600–1640)
IGM SERPL-MCNC: 97 MG/DL (ref 50–300)
LYMPHOCYTES # BLD AUTO: 1857 CELLS/UL (ref 850–3900)
LYMPHOCYTES NFR BLD AUTO: 39.5 %
MCH RBC QN AUTO: 32.9 PG (ref 27–33)
MCHC RBC AUTO-ENTMCNC: 33.2 G/DL (ref 32–36)
MCV RBC AUTO: 99.2 FL (ref 80–100)
MONOCYTES # BLD AUTO: 320 CELLS/UL (ref 200–950)
MONOCYTES NFR BLD AUTO: 6.8 %
NEUTROPHILS # BLD AUTO: 2515 CELLS/UL (ref 1500–7800)
NEUTROPHILS NFR BLD AUTO: 53.5 %
PLATELET # BLD AUTO: 188 THOUSAND/UL (ref 140–400)
PMV BLD REES-ECKER: 10.6 FL (ref 7.5–12.5)
POTASSIUM SERPL-SCNC: 4.8 MMOL/L (ref 3.5–5.3)
RBC # BLD AUTO: 3.74 MILLION/UL (ref 3.8–5.1)
SODIUM SERPL-SCNC: 139 MMOL/L (ref 135–146)
WBC # BLD AUTO: 4.7 THOUSAND/UL (ref 3.8–10.8)

## 2025-09-12 ENCOUNTER — APPOINTMENT (OUTPATIENT)
Dept: OBSTETRICS AND GYNECOLOGY | Facility: CLINIC | Age: 35
End: 2025-09-12
Payer: COMMERCIAL

## 2025-09-26 ENCOUNTER — APPOINTMENT (OUTPATIENT)
Dept: OBSTETRICS AND GYNECOLOGY | Facility: CLINIC | Age: 35
End: 2025-09-26
Payer: COMMERCIAL

## 2026-02-26 ENCOUNTER — APPOINTMENT (OUTPATIENT)
Dept: ALLERGY | Facility: CLINIC | Age: 36
End: 2026-02-26
Payer: COMMERCIAL

## 2026-03-05 ENCOUNTER — APPOINTMENT (OUTPATIENT)
Dept: ALLERGY | Facility: CLINIC | Age: 36
End: 2026-03-05
Payer: COMMERCIAL